# Patient Record
Sex: FEMALE | Race: WHITE | NOT HISPANIC OR LATINO | Employment: FULL TIME | URBAN - METROPOLITAN AREA
[De-identification: names, ages, dates, MRNs, and addresses within clinical notes are randomized per-mention and may not be internally consistent; named-entity substitution may affect disease eponyms.]

---

## 2018-01-11 NOTE — RESULT NOTES
Verified Results  (1) CBC/PLT/DIFF [id number] 08:43AM Claudette Pigg Order Number: [id number]     Order Number: [id number]     Test Name Result Flag Reference   WBC COUNT 5 04 Thousand/uL  4 31-10 16   RBC COUNT 4 83 Million/uL  3 81-5 12   HEMOGLOBIN 13 9 g/dL  11 5-15 4   HEMATOCRIT 41 0 %  34 8-46  1   MCV 84 9 fL  82 0-98 0   MCH 28 8 pg  26 8-34 3   MCHC 33 9 g/dL  31 4-37 4   RDW 13 8 %  11 6-15 1   MPV 11 0 fL  8 9-12 7   PLATELET COUNT 321 Thousands/uL  149-390   NEUTROPHILS RELATIVE PERCENT 50 %  43-75   LYMPHOCYTES RELATIVE PERCENT 39 %  14-44   MONOCYTES RELATIVE PERCENT 8 %  4-12   EOSINOPHILS RELATIVE PERCENT 2 %  0-6   BASOPHILS RELATIVE PERCENT 1 %  0-1   NEUTROPHILS ABSOLUTE COUNT 2 53 Thousands/µL  1 85-7 62   LYMPHOCYTES ABSOLUTE COUNT 1 97 Thousands/µL  0 60-4 47   MONOCYTES ABSOLUTE COUNT 0 39 Thousand/µL  0 17-1 22   EOSINOPHILS ABSOLUTE COUNT 0 12 Thousand/µL  0 00-0 61   BASOPHILS ABSOLUTE COUNT 0 03 Thousands/µL  0 00-0 10

## 2018-01-13 NOTE — RESULT NOTES
Verified Results  * XR TIBIA FIBULA 2 VIEW LEFT [id number] 08:47AM [employer] Order Number: [id number]     Test Name Result Flag Reference   XR TIBIA FIBULA 2 VW LEFT (Report)     LEFT TIBIA AND FIBULA     INDICATION: Left tibia and fibula pain  COMPARISON: None     VIEWS: AP and lateral; 2 images     FINDINGS:     There is no acute fracture or dislocation  No degenerative changes  No lytic or blastic lesions are seen  Soft tissues are unremarkable  IMPRESSION:     No acute osseous abnormality  Signed by:   Gt Smith DO   2/2/16     * XR TIBIA FIBULA 2 VIEW RIGHT [id number] 08:47AM [employer] Order Number: [id number]     Test Name Result Flag Reference   XR TIBIA FIBULA 2 VW RIGHT (Report)     RIGHT TIBIA AND FIBULA     INDICATION: Right tibia and fibula pain  COMPARISON: None     VIEWS: AP and lateral; 2 images     FINDINGS:     There is no acute fracture or dislocation  No degenerative changes  No lytic or blastic lesions are seen  Soft tissues are unremarkable  IMPRESSION:     No acute osseous abnormality       Signed by:   Gt Smith DO   2/2/16

## 2018-01-18 NOTE — RESULT NOTES
Verified Results  CT HEAD W WO CONTRAST [id number] 09:27AM [place] [facility]     Test Name Result Flag Reference   CT HEAD W WO CONTRAST (Report)     CT BRAIN - WITH AND WITHOUT CONTRAST     INDICATION: Severe headache     COMPARISON: None  TECHNIQUE: CT examination of the brain was performed both prior to and after the administration of intravenous contrast  In addition to axial images, coronal reformatted images were created and submitted for interpretation  Examination was performed    utilizing techniques to minimize radiation dose, including the use of dose reduction software  100 mL of Omnipaque 350 was injected intravenously, without immediate consequence  IMAGE QUALITY: Diagnostic  FINDINGS:      PARENCHYMA: No mass, mass effect or midline shift  No parenchymal abnormality  No hemorrhage  No signs of acute infarction  No abnormal enhancement   Unremarkable vasculature  VENTRICLES AND EXTRA-AXIAL SPACES: Normal for patient's age  VISUALIZED ORBITS AND PARANASAL SINUSES: Unremarkable  CALVARIUM: Normal        IMPRESSION:     Normal unenhanced and enhanced CT of the brain  Signed by:    Corrina Casey MD   2/5/16

## 2018-12-26 ENCOUNTER — OFFICE VISIT (OUTPATIENT)
Dept: URGENT CARE | Facility: CLINIC | Age: 28
End: 2018-12-26
Payer: COMMERCIAL

## 2018-12-26 VITALS
OXYGEN SATURATION: 98 % | RESPIRATION RATE: 20 BRPM | HEART RATE: 95 BPM | HEIGHT: 66 IN | SYSTOLIC BLOOD PRESSURE: 118 MMHG | TEMPERATURE: 98.7 F | WEIGHT: 132 LBS | DIASTOLIC BLOOD PRESSURE: 64 MMHG | BODY MASS INDEX: 21.21 KG/M2

## 2018-12-26 DIAGNOSIS — J04.0 ACUTE LARYNGITIS: Primary | ICD-10-CM

## 2018-12-26 PROCEDURE — 99203 OFFICE O/P NEW LOW 30 MIN: CPT | Performed by: FAMILY MEDICINE

## 2018-12-26 RX ORDER — PREDNISOLONE SODIUM PHOSPHATE 15 MG/5ML
15 SOLUTION ORAL DAILY
Qty: 75 ML | Refills: 0 | Status: SHIPPED | OUTPATIENT
Start: 2018-12-26 | End: 2018-12-31

## 2018-12-26 NOTE — PATIENT INSTRUCTIONS
Follow up with PCP in 3-5 days  Proceed to  ER if symptoms worsen  Laryngitis   WHAT YOU NEED TO KNOW:   Laryngitis is when your larynx is swollen because of an infection or irritation  The larynx is also called the voice box because it contains your vocal cords  Your vocal cords also swell and change shape, which can cause your voice to sound different  DISCHARGE INSTRUCTIONS:   Take your medicine as directed  Contact your healthcare provider if you think your medicine is not helping or if you have side effects  Tell him of her if you are allergic to any medicine  Keep a list of the medicines, vitamins, and herbs you take  Include the amounts, and when and why you take them  Bring the list or the pill bottles to follow-up visits  Carry your medicine list with you in case of an emergency  Prevent laryngitis:   · Rest your voice:  Do not shout or scream if you get laryngitis often  This will help prevent swelling and irritation of your larynx  · Avoid irritants and harmful substances:  Do not breathe in chemicals or allergens, such as pollen  Alcohol and tobacco can also irritate your larynx  · Avoid foods and liquids that can cause acid reflux: These may include carbonated drinks, spicy foods and sauces, citrus fruit, peppermint, and chocolate  · Avoid the spread of germs:        [facility] your hands often with soap and water  Carry germ-killing gel with you  You can use the gel to clean your hands when there is no soap and water available  ¨ Do not touch your eyes, nose, or mouth unless you have washed your hands first     ¨ Always cover your mouth when you cough  Cough into a tissue or your shirtsleeve so you do not spread germs from your hands  ¨ Try to avoid people who have a cold or the flu  If you are sick, stay away from others as much as possible  Follow up with your healthcare provider as directed:  Write down your questions so you remember to ask them during your visits     Contact your healthcare provider if:   · You have a fever  · You feel large, tender lumps in your neck  · You are hoarse for more than 7 days  · You have new or increased throat pain  · You have questions about your condition or care  Return to the emergency department if:   · Your throat is bleeding  · You are hoarse for more than 7 days and your chest feels tight  · You are drooling and have trouble swallowing  · You have trouble breathing  © 2017 [facility] Information is for End User's use only and may not be sold, redistributed or otherwise used for commercial purposes  All illustrations and images included in CareNotes® are the copyrighted property of A D A M , Inc  or Chepe Mandel  The above information is an  only  It is not intended as medical advice for individual conditions or treatments  Talk to your doctor, nurse or pharmacist before following any medical regimen to see if it is safe and effective for you

## 2020-08-31 ENCOUNTER — OFFICE VISIT (OUTPATIENT)
Dept: FAMILY MEDICINE CLINIC | Facility: CLINIC | Age: 30
End: 2020-08-31
Payer: COMMERCIAL

## 2020-08-31 VITALS
TEMPERATURE: 97.8 F | HEIGHT: 67 IN | BODY MASS INDEX: 24.42 KG/M2 | DIASTOLIC BLOOD PRESSURE: 78 MMHG | WEIGHT: 155.6 LBS | SYSTOLIC BLOOD PRESSURE: 110 MMHG

## 2020-08-31 DIAGNOSIS — H69.81 DYSFUNCTION OF RIGHT EUSTACHIAN TUBE: Primary | ICD-10-CM

## 2020-08-31 PROCEDURE — 1036F TOBACCO NON-USER: CPT | Performed by: FAMILY MEDICINE

## 2020-08-31 PROCEDURE — 3725F SCREEN DEPRESSION PERFORMED: CPT | Performed by: FAMILY MEDICINE

## 2020-08-31 PROCEDURE — 99202 OFFICE O/P NEW SF 15 MIN: CPT | Performed by: FAMILY MEDICINE

## 2020-08-31 PROCEDURE — 3008F BODY MASS INDEX DOCD: CPT | Performed by: FAMILY MEDICINE

## 2020-08-31 RX ORDER — MOMETASONE FUROATE 50 UG/1
2 SPRAY, METERED NASAL DAILY
Qty: 17 G | Refills: 1 | Status: SHIPPED | OUTPATIENT
Start: 2020-08-31

## 2020-08-31 NOTE — PROGRESS NOTES
Assessment/Plan:  I believe patient has dysfunction of her right eustachian tube  Rx put in for Nasonex spray, and she will pickup Claritin over-the-counter  Push fluids  Call us if symptoms continue or increase  Patient does not wish to have flu shot  Problem List Items Addressed This Visit     None      Visit Diagnoses     Dysfunction of right eustachian tube    -  Primary    Relevant Medications    mometasone (NASONEX) 50 mcg/act nasal spray           Diagnoses and all orders for this visit:    Dysfunction of right eustachian tube  -     mometasone (NASONEX) 50 mcg/act nasal spray; 2 sprays into each nostril daily        No problem-specific Assessment & Plan notes found for this encounter  Subjective:      Patient ID: Perla England is a 31 y o  female  Patient here today stating for the past couple weeks has had some heaviness" in the right ear  Sometimes a sharp pain  Patient has mild decreased hearing  No discharge  No fever chills  Left ear is not as bad  She can hear well out of the left ear  The following portions of the patient's history were reviewed and updated as appropriate:   She has no past medical history on file  ,  does not have a problem list on file  ,   has no past surgical history on file  ,  family history is not on file  ,   reports that she has never smoked  She has never used smokeless tobacco  She reports previous alcohol use  No history on file for drug ,  has No Known Allergies     Current Outpatient Medications   Medication Sig Dispense Refill    mometasone (NASONEX) 50 mcg/act nasal spray 2 sprays into each nostril daily 17 g 1     No current facility-administered medications for this visit  Review of Systems   Constitutional: Negative  HENT: Positive for ear pain and hearing loss (Mild, in the right ear)  Negative for congestion and rhinorrhea  Respiratory: Negative  Cardiovascular: Negative  Gastrointestinal: Negative      Genitourinary: Negative  Objective:  Vitals:    08/31/20 0945   BP: 110/78   Temp: 97 8 °F (36 6 °C)   Weight: 70 6 kg (155 lb 9 6 oz)   Height: 5' 6 5" (1 689 m)     Body mass index is 24 74 kg/m²  Physical Exam  Vitals signs reviewed  Constitutional:       General: She is not in acute distress  Appearance: She is well-developed  She is not diaphoretic  HENT:      Head: Normocephalic and atraumatic  Right Ear: Tympanic membrane is retracted (Mild)  Tympanic membrane is not erythematous  Ears:      Comments: Cerumen noted in the left canal  Eyes:      Conjunctiva/sclera: Conjunctivae normal    Cardiovascular:      Rate and Rhythm: Normal rate and regular rhythm  Heart sounds: Normal heart sounds  No murmur  No friction rub  No gallop  Pulmonary:      Effort: Pulmonary effort is normal  No respiratory distress  Breath sounds: Normal breath sounds  No wheezing or rales  Neurological:      Mental Status: She is alert and oriented to person, place, and time  Psychiatric:         Behavior: Behavior normal          Thought Content:  Thought content normal          Judgment: Judgment normal

## 2021-09-16 ENCOUNTER — OFFICE VISIT (OUTPATIENT)
Dept: URGENT CARE | Facility: CLINIC | Age: 31
End: 2021-09-16
Payer: COMMERCIAL

## 2021-09-16 VITALS
HEART RATE: 132 BPM | WEIGHT: 129 LBS | SYSTOLIC BLOOD PRESSURE: 105 MMHG | DIASTOLIC BLOOD PRESSURE: 67 MMHG | RESPIRATION RATE: 20 BRPM | BODY MASS INDEX: 20.51 KG/M2 | TEMPERATURE: 98.8 F | OXYGEN SATURATION: 98 %

## 2021-09-16 DIAGNOSIS — R51.9 NONINTRACTABLE HEADACHE, UNSPECIFIED CHRONICITY PATTERN, UNSPECIFIED HEADACHE TYPE: ICD-10-CM

## 2021-09-16 DIAGNOSIS — R55 SYNCOPE, UNSPECIFIED SYNCOPE TYPE: Primary | ICD-10-CM

## 2021-09-16 DIAGNOSIS — S01.511A LIP LACERATION, INITIAL ENCOUNTER: ICD-10-CM

## 2021-09-16 PROCEDURE — S9088 SERVICES PROVIDED IN URGENT: HCPCS | Performed by: PHYSICIAN ASSISTANT

## 2021-09-16 PROCEDURE — 99213 OFFICE O/P EST LOW 20 MIN: CPT | Performed by: PHYSICIAN ASSISTANT

## 2021-09-16 NOTE — PATIENT INSTRUCTIONS
If symptoms reoccur or headache worsens go to the emergency room for further evaluation  Syncope   WHAT YOU NEED TO KNOW:   Syncope is also called fainting or passing out  Syncope is a sudden, temporary loss of consciousness, followed by a fall from a standing or sitting position  Syncope ranges from not serious to a sign of a more serious condition that needs to be treated  You can control some health conditions that cause syncope  Your healthcare providers can help you create a plan to manage syncope and prevent episodes  DISCHARGE INSTRUCTIONS:   Seek care immediately if:   · You are bleeding because you hit your head when you fainted  · You suddenly have double vision, difficulty speaking, numbness, and cannot move your arms or legs  · You have chest pain and trouble breathing  · You vomit blood or material that looks like coffee grounds  · You see blood in your bowel movement  Contact your healthcare provider if:   · You have new or worsening symptoms  · You have another syncope episode  · You have a headache, fast heartbeat, or feel too dizzy to stand up  · You have questions or concerns about your condition or care  Medicines:   · Medicines  may be needed to help your heart pump strongly and regularly  Your healthcare provider may also make changes to any medicines that are causing syncope  · Take your medicine as directed  Contact your healthcare provider if you think your medicine is not helping or if you have side effects  Tell him or her if you are allergic to any medicine  Keep a list of the medicines, vitamins, and herbs you take  Include the amounts, and when and why you take them  Bring the list or the pill bottles to follow-up visits  Carry your medicine list with you in case of an emergency  Follow up with your healthcare provider as directed:  Write down your questions so you remember to ask them during your visits     Manage syncope:   · Keep a record of your syncope episodes  Include your symptoms and your activity before and after the episode  The record can help your healthcare provider find the cause of your syncope and help you manage episodes  · Sit or lie down when needed  This includes when you feel dizzy, your throat is getting tight, and your vision changes  Raise your legs above the level of your heart  · Take slow, deep breaths if you start to breathe faster with anxiety or fear  This can help decrease dizziness and the feeling that you might faint  · Check your blood pressure often  This is important if you take medicine to lower your blood pressure  Check your blood pressure when you are lying down and when you are standing  Ask how often to check during the day  Keep a record of your blood pressure numbers  Your healthcare provider may use the record to help plan your treatment  Prevent a syncope episode:   · Move slowly and let yourself get used to one position before you move to another position  This is very important when you change from a lying or sitting position to a standing position  Take some deep breaths before you stand up from a lying position  Stand up slowly  Sudden movements may cause a fainting spell  Sit on the side of the bed or couch for a few minutes before you stand up  If you are on bedrest, try to be upright for about 2 hours each day, or as directed  Do not lock your legs if you are standing for a long period of time  Move your legs and bend your knees to keep blood flowing  · Follow your healthcare provider's recommendations  Your provider may  recommend that you drink more liquids to prevent dehydration  You may also need to have more salt to keep your blood pressure from dropping too low and causing syncope  Your provider will tell you how much liquid and sodium to have each day  He or she will also tell you how much physical activity is safe for you   This will depend on what is causing your syncope  · Watch for signs of low blood sugar  These include hunger, nervousness, sweating, and fast or fluttery heartbeats  Talk with your healthcare provider about ways to keep your blood sugar level steady  · Do not strain if you are constipated  You may faint if you strain to have a bowel movement  Walking is the best way to get your bowels moving  Eat foods high in fiber to make it easier to have a bowel movement  Good examples are high-fiber cereals, beans, vegetables, and whole-grain breads  Prune juice may help make bowel movements softer  · Be careful in hot weather  Heat can cause a syncope episode  Limit activity done outside on hot days  Physical activity in hot weather can lead to dehydration  This can cause an episode  © Copyright [employer] 2021 Information is for End User's use only and may not be sold, redistributed or otherwise used for commercial purposes  All illustrations and images included in CareNotes® are the copyrighted property of A D A M , Inc  or [facility] Jakob Gipson   The above information is an  only  It is not intended as medical advice for individual conditions or treatments  Talk to your doctor, nurse or pharmacist before following any medical regimen to see if it is safe and effective for you

## 2021-09-16 NOTE — PROGRESS NOTES
[facility]        NAME: Ana Dorado is a 36 y o  female  : 1990    MRN: [id number]  DATE: 2021  TIME: 10:21 AM    Assessment and Plan   Syncope, unspecified syncope type [R55]  1  Syncope, unspecified syncope type     2  Lip laceration, initial encounter     3  Nonintractable headache, unspecified chronicity pattern, unspecified headache type           Patient Instructions       Follow up with PCP in 3-5 days  Proceed to  ER if symptoms worsen  Chief Complaint     Chief Complaint   Patient presents with    Loss of Consciousness     passed out , woke up on bathroom floor this am , cut inside of lip, right rib area pain         History of Present Illness       Patient states at approximately 7:00 a m  this morning she was sitting on the toilet urinating and she felt very hot and passed out her 3year-old son awoke or up stating she fell on the floor  She believes she may have been laying there for approximately 5 minutes  She now has a typical headache over her left occipital area  Patient suffers from migraines  No changes in vision nausea vomiting dizziness or difficulty concentrating  She did sustain a laceration to the inner aspect of the right lower lip  She is not on any blood thinners and does not take aspirin  Patient states she feels back to normal and her headache is her typical headache  She plans on taking her medication to treat her headaches when she arrives home  Review of Systems   Review of Systems   Constitutional: Negative for chills and fever  Respiratory: Negative for cough and shortness of breath  Gastrointestinal: Negative for abdominal pain, nausea and vomiting  Musculoskeletal: Negative for arthralgias and myalgias  Skin: Negative for rash  Neurological: Positive for facial asymmetry  Negative for dizziness, weakness, light-headedness and numbness           Current Medications       Current Outpatient Medications:    mometasone (NASONEX) 50 mcg/act nasal spray, 2 sprays into each nostril daily (Patient not taking: Reported on 9/16/2021), Disp: 17 g, Rfl: 1    Current Allergies     Allergies as of 09/16/2021    (No Known Allergies)            The following portions of the patient's history were reviewed and updated as appropriate: allergies, current medications, past family history, past medical history, past social history, past surgical history and problem list      History reviewed  No pertinent past medical history  Past Surgical History:   Procedure Laterality Date    WISDOM TOOTH EXTRACTION         History reviewed  No pertinent family history  Medications have been verified  Objective   /67   Pulse (!) 132   Temp 98 8 °F (37 1 °C)   Resp 20   Wt 58 5 kg (129 lb)   LMP 09/14/2021 (Exact Date)   SpO2 98%   Breastfeeding No   BMI 20 51 kg/m²   Patient's last menstrual period was 09/14/2021 (exact date)  Physical Exam     Physical Exam  Vitals and nursing note reviewed  Constitutional:       Appearance: Normal appearance  She is normal weight  HENT:      Head: Normocephalic and atraumatic  Right Ear: Ear canal and external ear normal       Left Ear: Ear canal and external ear normal       Nose: Nose normal       Mouth/Throat:      Mouth: Mucous membranes are moist       Pharynx: Oropharynx is clear  Comments: Superficial laceration plantar aspect of the right lower lip which is healing and not bleeding  Eyes:      Extraocular Movements: Extraocular movements intact  Conjunctiva/sclera: Conjunctivae normal       Pupils: Pupils are equal, round, and reactive to light  Comments: No nystagmus  Cardiovascular:      Rate and Rhythm: Normal rate and regular rhythm  Heart sounds: Normal heart sounds  Pulmonary:      Effort: Pulmonary effort is normal       Breath sounds: Normal breath sounds  Musculoskeletal:         General: Normal range of motion        Cervical back: Normal range of motion and neck supple  No tenderness  Skin:     General: Skin is warm  Neurological:      General: No focal deficit present  Mental Status: She is alert and oriented to person, place, and time  Cranial Nerves: No cranial nerve deficit  Motor: No weakness  Coordination: Coordination normal       Gait: Gait normal       Deep Tendon Reflexes: Reflexes normal    Psychiatric:         Mood and Affect: Mood normal          Behavior: Behavior normal          Thought Content:  Thought content normal          Judgment: Judgment normal

## 2022-10-24 ENCOUNTER — OFFICE VISIT (OUTPATIENT)
Dept: URGENT CARE | Facility: CLINIC | Age: 32
End: 2022-10-24
Payer: COMMERCIAL

## 2022-10-24 VITALS
BODY MASS INDEX: 20.51 KG/M2 | OXYGEN SATURATION: 99 % | TEMPERATURE: 99.1 F | RESPIRATION RATE: 20 BRPM | HEART RATE: 100 BPM | WEIGHT: 129 LBS

## 2022-10-24 DIAGNOSIS — H72.91 OTITIS MEDIA, SEROUS, TM RUPTURE, RIGHT: Primary | ICD-10-CM

## 2022-10-24 DIAGNOSIS — H65.91 OTITIS MEDIA, SEROUS, TM RUPTURE, RIGHT: Primary | ICD-10-CM

## 2022-10-24 DIAGNOSIS — H92.01 ACUTE EAR PAIN, RIGHT: ICD-10-CM

## 2022-10-24 PROCEDURE — 99213 OFFICE O/P EST LOW 20 MIN: CPT | Performed by: NURSE PRACTITIONER

## 2022-10-24 RX ORDER — OFLOXACIN 3 MG/ML
10 SOLUTION AURICULAR (OTIC) 2 TIMES DAILY
Qty: 10 ML | Refills: 0 | Status: SHIPPED | OUTPATIENT
Start: 2022-10-24 | End: 2022-11-07

## 2022-10-24 NOTE — PROGRESS NOTES
[facility]        NAME: Giovanna Prater is a 29 y o  female  : 1990    MRN: [id number]  DATE: 2022  TIME: 8:56 AM    Assessment and Plan   Otitis media, serous, tm rupture, right [H65 91, H72 91]  1  Otitis media, serous, tm rupture, right  ofloxacin (FLOXIN) 0 3 % otic solution   2  Acute ear pain, right  ofloxacin (FLOXIN) 0 3 % otic solution         Patient Instructions       Follow up with PCP in 3-5 days  Proceed to  ER if symptoms worsen  You are to use the ear drops as prescribed  You appear to have a ruptured ear drum  You are to take tylenol or motrin for pain  Apply cotton balls to absorb drainage  Follow up with your PCP in 2-3 days  See ENT if symptoms do not improve   Go to the eD if symptoms worsen         Chief Complaint     Chief Complaint   Patient presents with   • Earache     Bleeding from ear , since early this am         History of Present Illness       This is a 29year old female who states has linda congested and this am she woke up to severe right ear pain and then noted blood and drainage from the ear  Doesn't really admit to instant relief after noting bloody drainage  She states she has been taking tylenol and  Motrin for pain  Denies fevers  Denies pregnancy  She states she has been taking OTC meds for cold symptoms  Review of Systems   Review of Systems   Constitutional: Negative  HENT: Positive for congestion, ear discharge and ear pain  Eyes: Negative  Respiratory: Negative  Cardiovascular: Negative  Gastrointestinal: Negative  Endocrine: Negative  Genitourinary: Negative  Musculoskeletal: Negative  Skin: Negative  Allergic/Immunologic: Negative  Neurological: Negative  Hematological: Negative  Psychiatric/Behavioral: Negative            Current Medications       Current Outpatient Medications:   •  ofloxacin (FLOXIN) 0 3 % otic solution, Administer 10 drops to the right ear 2 (two) times a day for 14 days, Disp: 10 mL, Rfl: 0  •  mometasone (NASONEX) 50 mcg/act nasal spray, 2 sprays into each nostril daily (Patient not taking: No sig reported), Disp: 17 g, Rfl: 1    Current Allergies     Allergies as of 10/24/2022   • (No Known Allergies)            The following portions of the patient's history were reviewed and updated as appropriate: allergies, current medications, past family history, past medical history, past social history, past surgical history and problem list      History reviewed  No pertinent past medical history  Past Surgical History:   Procedure Laterality Date   • WISDOM TOOTH EXTRACTION         History reviewed  No pertinent family history  Medications have been verified  Objective   Pulse 100   Temp 99 1 °F (37 3 °C)   Resp 20   Wt 58 5 kg (129 lb)   LMP 10/09/2022 (Exact Date)   SpO2 99%   BMI 20 51 kg/m²   Patient's last menstrual period was 10/09/2022 (exact date)  Physical Exam     Physical Exam  Vitals and nursing note reviewed  Constitutional:       General: She is not in acute distress  Appearance: Normal appearance  She is normal weight  She is not ill-appearing, toxic-appearing or diaphoretic  HENT:      Head: Normocephalic and atraumatic  Left Ear: Tympanic membrane and ear canal normal       Ears:      Comments: Right ear canal with blood in it  TM is noted to be shiny, no blood at TM however there does appear to be a small hole  Pain with otoscope insertion  Nose: Congestion present  Mouth/Throat:      Mouth: Mucous membranes are moist    Eyes:      Extraocular Movements: Extraocular movements intact  Cardiovascular:      Rate and Rhythm: Normal rate and regular rhythm  Pulses: Normal pulses  Heart sounds: Normal heart sounds  Pulmonary:      Effort: Pulmonary effort is normal       Breath sounds: Normal breath sounds  Abdominal:      General: There is no distension  Palpations: Abdomen is soft  Tenderness: There is no abdominal tenderness  Musculoskeletal:         General: Normal range of motion  Cervical back: Normal range of motion and neck supple  Skin:     General: Skin is warm and dry  Capillary Refill: Capillary refill takes less than 2 seconds  Neurological:      General: No focal deficit present  Mental Status: She is alert and oriented to person, place, and time  Psychiatric:         Mood and Affect: Mood normal          Behavior: Behavior normal          Thought Content:  Thought content normal          Judgment: Judgment normal

## 2022-10-24 NOTE — PATIENT INSTRUCTIONS
You are to use the ear drops as prescribed  You appear to have a ruptured ear drum  You are to take tylenol or motrin for pain  Apply cotton balls to absorb drainage  Follow up with your PCP in 2-3 days    See ENT if symptoms do not improve   Go to the eD if symptoms worsen

## 2022-10-24 NOTE — LETTER
October 24, 2022     Patient: Helio Schroeder   YOB: 1990   Date of Visit: 10/24/2022       To Whom It May Concern: It is my medical opinion that Carolina Kelly may return to work on 10/25/2022  If you have any questions or concerns, please don't hesitate to call           Sincerely,        Unique JOSE    CC: No Recipients

## 2022-10-26 ENCOUNTER — OFFICE VISIT (OUTPATIENT)
Dept: FAMILY MEDICINE CLINIC | Facility: CLINIC | Age: 32
End: 2022-10-26
Payer: COMMERCIAL

## 2022-10-26 VITALS
HEART RATE: 97 BPM | SYSTOLIC BLOOD PRESSURE: 108 MMHG | WEIGHT: 140 LBS | DIASTOLIC BLOOD PRESSURE: 76 MMHG | HEIGHT: 66 IN | OXYGEN SATURATION: 98 % | TEMPERATURE: 97.2 F | BODY MASS INDEX: 22.5 KG/M2

## 2022-10-26 DIAGNOSIS — H92.21 EAR DISCHARGE BLOOD, RIGHT: Primary | ICD-10-CM

## 2022-10-26 DIAGNOSIS — H69.81 DYSFUNCTION OF RIGHT EUSTACHIAN TUBE: ICD-10-CM

## 2022-10-26 PROCEDURE — 99213 OFFICE O/P EST LOW 20 MIN: CPT | Performed by: FAMILY MEDICINE

## 2022-10-26 RX ORDER — MOMETASONE FUROATE 50 UG/1
2 SPRAY, METERED NASAL DAILY
Qty: 17 G | Refills: 3 | Status: SHIPPED | OUTPATIENT
Start: 2022-10-26

## 2022-10-26 NOTE — PROGRESS NOTES
Name: Kathleen Rosario      : 1990      MRN: [id number]  Encounter Provider: Suzy Devlin DO  Encounter Date: 10/26/2022   Encounter department: [facility] [address]   Patient will continue the Floxin otic drops  Referral made to ENT  Also refilled her Nasonex nose spray  Follow-up here as scheduled or p r n   1  Ear discharge blood, right  -     Ambulatory Referral to Otolaryngology; Future    2  Dysfunction of right eustachian tube  -     mometasone (NASONEX) 50 mcg/act nasal spray; 2 sprays into each nostril daily  -     Ambulatory Referral to Otolaryngology; Future         Subjective     Patient here today for follow-up from urgent care  Patient states for the past couple weeks has had sinus congestion and ear congestion  On Monday woke up with severe right ear pain  Later on in the day, noticed bloody discharge from the right ear  Went to urgent care, diagnosed with a perforation of the right ear drum  They gave for Floxin otic drops which she has been using since yesterday  Patient still has had some blood discharge from her ear, along with decreased hearing  Pain is not as bad as it has been in the right ear  Review of Systems   Constitutional: Negative  HENT: Positive for ear discharge ( right ear), ear pain ( right ear) and hearing loss (Right ear)  Respiratory: Negative  Cardiovascular: Negative  Gastrointestinal: Negative  Genitourinary: Negative  History reviewed  No pertinent past medical history    Past Surgical History:   Procedure Laterality Date   • WISDOM TOOTH EXTRACTION       Family History   Problem Relation Age of Onset   • No Known Problems Mother      Social History     Socioeconomic History   • Marital status: Single     Spouse name: None   • Number of children: None   • Years of education: None   • Highest education level: None   Occupational History   • None   Tobacco Use   • Smoking status: Never Smoker   • Smokeless tobacco: Never Used   Vaping Use   • Vaping Use: Never used   Substance and Sexual Activity   • Alcohol use: Not Currently   • Drug use: Never   • Sexual activity: None   Other Topics Concern   • None   Social History Narrative   • None     Social Determinants of Health     Financial Resource Strain: Not on file   Food Insecurity: Not on file   Transportation Needs: Not on file   Physical Activity: Not on file   Stress: Not on file   Social Connections: Not on file   Intimate Partner Violence: Not on file   Housing Stability: Not on file     Current Outpatient Medications on File Prior to Visit   Medication Sig   • ofloxacin (FLOXIN) 0 3 % otic solution Administer 10 drops to the right ear 2 (two) times a day for 14 days   • [DISCONTINUED] mometasone (NASONEX) 50 mcg/act nasal spray 2 sprays into each nostril daily     No Known Allergies  Immunization History   Administered Date(s) Administered   • Tdap 02/14/2017       Objective     /76   Pulse 97   Temp (!) 97 2 °F (36 2 °C)   Ht 5' 6" (1 676 m)   Wt 63 5 kg (140 lb)   LMP 10/09/2022 (Exact Date)   SpO2 98%   BMI 22 60 kg/m²     Physical Exam  Vitals reviewed  Constitutional:       General: She is not in acute distress  Appearance: Normal appearance  She is well-developed  She is not ill-appearing, toxic-appearing or diaphoretic  HENT:      Head: Normocephalic and atraumatic  Right Ear: Tympanic membrane is erythematous and bulging  Left Ear: Tympanic membrane normal       Ears:      Comments: Right ear drum bulging  I was not able to appreciate a perforation  Eyes:      Conjunctiva/sclera: Conjunctivae normal    Cardiovascular:      Rate and Rhythm: Normal rate and regular rhythm  Heart sounds: Normal heart sounds  No murmur heard  No friction rub  No gallop  Pulmonary:      Effort: Pulmonary effort is normal  No respiratory distress  Breath sounds: Normal breath sounds  No wheezing, rhonchi or rales  Musculoskeletal:      Right lower leg: No edema  Left lower leg: No edema  Neurological:      General: No focal deficit present  Mental Status: She is alert and oriented to person, place, and time  Psychiatric:         Mood and Affect: Mood normal          Behavior: Behavior normal          Thought Content:  Thought content normal          Judgment: Judgment normal        Saulo Roth DO

## 2023-04-15 ENCOUNTER — HOSPITAL ENCOUNTER (INPATIENT)
Facility: HOSPITAL | Age: 33
LOS: 1 days | Discharge: HOME | End: 2023-04-16
Attending: OBSTETRICS & GYNECOLOGY | Admitting: OBSTETRICS & GYNECOLOGY

## 2023-04-15 DIAGNOSIS — N83.209 HEMORRHAGIC OVARIAN CYST: Primary | ICD-10-CM

## 2023-04-15 DIAGNOSIS — Z87.42 S/P OVARIAN CYSTECTOMY: ICD-10-CM

## 2023-04-15 DIAGNOSIS — Z98.890 S/P OVARIAN CYSTECTOMY: ICD-10-CM

## 2023-04-15 PROBLEM — D62 ACUTE BLOOD LOSS ANEMIA (ABLA): Status: ACTIVE | Noted: 2023-04-15

## 2023-04-15 LAB
ABO GROUP BLD BPU: NORMAL
ABO GROUP BLD: NORMAL
BLD GP AB SCN SERPL QL: NEGATIVE
BPU ID: NORMAL
CROSSMATCH: NORMAL
ERYTHROCYTE [DISTWIDTH] IN BLOOD BY AUTOMATED COUNT: 14.6 % (ref 11.6–15.1)
HCT VFR BLD AUTO: 30.7 % (ref 34.8–46.1)
HGB BLD-MCNC: 10 G/DL (ref 11.5–15.4)
MCH RBC QN AUTO: 27.4 PG (ref 26.8–34.3)
MCHC RBC AUTO-ENTMCNC: 32.6 G/DL (ref 31.4–37.4)
MCV RBC AUTO: 84 FL (ref 82–98)
PLATELET # BLD AUTO: 251 THOUSANDS/UL (ref 149–390)
PMV BLD AUTO: 9.8 FL (ref 8.9–12.7)
RBC # BLD AUTO: 3.65 MILLION/UL (ref 3.81–5.12)
RH BLD: POSITIVE
SPECIMEN EXPIRATION DATE: NORMAL
UNIT DISPENSE STATUS: NORMAL
UNIT PRODUCT CODE: NORMAL
UNIT PRODUCT VOLUME: 300 ML
UNIT PRODUCT VOLUME: 300 ML
UNIT PRODUCT VOLUME: 350 ML
UNIT RH: NORMAL
WBC # BLD AUTO: 7.31 THOUSAND/UL (ref 4.31–10.16)

## 2023-04-15 PROCEDURE — 0UB04ZZ EXCISION OF RIGHT OVARY, PERCUTANEOUS ENDOSCOPIC APPROACH: ICD-10-PCS | Performed by: OBSTETRICS & GYNECOLOGY

## 2023-04-15 RX ORDER — MAGNESIUM HYDROXIDE 1200 MG/15ML
LIQUID ORAL AS NEEDED
Status: DISCONTINUED | OUTPATIENT
Start: 2023-04-15 | End: 2023-04-16 | Stop reason: HOSPADM

## 2023-04-15 RX ORDER — SODIUM CHLORIDE, SODIUM LACTATE, POTASSIUM CHLORIDE, CALCIUM CHLORIDE 600; 310; 30; 20 MG/100ML; MG/100ML; MG/100ML; MG/100ML
125 INJECTION, SOLUTION INTRAVENOUS CONTINUOUS
Status: DISCONTINUED | OUTPATIENT
Start: 2023-04-15 | End: 2023-04-16 | Stop reason: HOSPADM

## 2023-04-15 RX ORDER — ONDANSETRON 2 MG/ML
4 INJECTION INTRAMUSCULAR; INTRAVENOUS EVERY 6 HOURS PRN
Status: DISCONTINUED | OUTPATIENT
Start: 2023-04-15 | End: 2023-04-16 | Stop reason: HOSPADM

## 2023-04-16 VITALS
SYSTOLIC BLOOD PRESSURE: 105 MMHG | HEART RATE: 112 BPM | OXYGEN SATURATION: 98 % | RESPIRATION RATE: 20 BRPM | TEMPERATURE: 97.9 F | DIASTOLIC BLOOD PRESSURE: 66 MMHG

## 2023-04-16 PROBLEM — Z87.42 S/P OVARIAN CYSTECTOMY: Status: ACTIVE | Noted: 2023-04-16

## 2023-04-16 PROBLEM — Z98.890 S/P OVARIAN CYSTECTOMY: Status: ACTIVE | Noted: 2023-04-16

## 2023-04-16 PROBLEM — D62 ACUTE BLOOD LOSS ANEMIA (ABLA): Status: RESOLVED | Noted: 2023-04-15 | Resolved: 2023-04-16

## 2023-04-16 LAB
ERYTHROCYTE [DISTWIDTH] IN BLOOD BY AUTOMATED COUNT: 14.5 % (ref 11.6–15.1)
HCT VFR BLD AUTO: 28.6 % (ref 34.8–46.1)
HGB BLD-MCNC: 9.3 G/DL (ref 11.5–15.4)
MCH RBC QN AUTO: 27.4 PG (ref 26.8–34.3)
MCHC RBC AUTO-ENTMCNC: 32.5 G/DL (ref 31.4–37.4)
MCV RBC AUTO: 84 FL (ref 82–98)
PLATELET # BLD AUTO: 221 THOUSANDS/UL (ref 149–390)
PMV BLD AUTO: 9.7 FL (ref 8.9–12.7)
RBC # BLD AUTO: 3.39 MILLION/UL (ref 3.81–5.12)
WBC # BLD AUTO: 8.33 THOUSAND/UL (ref 4.31–10.16)

## 2023-04-16 RX ORDER — FENTANYL CITRATE/PF 50 MCG/ML
25 SYRINGE (ML) INJECTION
Status: DISCONTINUED | OUTPATIENT
Start: 2023-04-16 | End: 2023-04-16 | Stop reason: HOSPADM

## 2023-04-16 RX ORDER — BUPIVACAINE HYDROCHLORIDE 2.5 MG/ML
INJECTION, SOLUTION EPIDURAL; INFILTRATION; INTRACAUDAL AS NEEDED
Status: DISCONTINUED | OUTPATIENT
Start: 2023-04-16 | End: 2023-04-16 | Stop reason: HOSPADM

## 2023-04-16 RX ORDER — OXYCODONE HCL 5 MG/5 ML
5 SOLUTION, ORAL ORAL EVERY 4 HOURS PRN
Qty: 75 ML | Refills: 0 | Status: SHIPPED | OUTPATIENT
Start: 2023-04-16 | End: 2023-04-26

## 2023-04-16 RX ORDER — HYDROMORPHONE HCL/PF 1 MG/ML
0.5 SYRINGE (ML) INJECTION
Status: DISCONTINUED | OUTPATIENT
Start: 2023-04-16 | End: 2023-04-16 | Stop reason: HOSPADM

## 2023-04-16 RX ORDER — KETOROLAC TROMETHAMINE 30 MG/ML
15 INJECTION, SOLUTION INTRAMUSCULAR; INTRAVENOUS EVERY 6 HOURS PRN
Status: DISCONTINUED | OUTPATIENT
Start: 2023-04-16 | End: 2023-04-16 | Stop reason: HOSPADM

## 2023-04-16 RX ORDER — ONDANSETRON 2 MG/ML
4 INJECTION INTRAMUSCULAR; INTRAVENOUS ONCE AS NEEDED
Status: DISCONTINUED | OUTPATIENT
Start: 2023-04-16 | End: 2023-04-16 | Stop reason: HOSPADM

## 2023-04-16 RX ORDER — OXYCODONE HCL 5 MG/5 ML
5 SOLUTION, ORAL ORAL EVERY 4 HOURS PRN
Status: DISCONTINUED | OUTPATIENT
Start: 2023-04-16 | End: 2023-04-16 | Stop reason: HOSPADM

## 2023-04-16 RX ORDER — ACETAMINOPHEN 650 MG/20.3ML
650 SUSPENSION ORAL EVERY 6 HOURS
Status: DISCONTINUED | OUTPATIENT
Start: 2023-04-16 | End: 2023-04-16 | Stop reason: HOSPADM

## 2023-04-16 RX ORDER — ACETAMINOPHEN 650 MG/20.3ML
650 SUSPENSION ORAL EVERY 6 HOURS PRN
Qty: 355 ML | Refills: 1 | Status: SHIPPED | OUTPATIENT
Start: 2023-04-16

## 2023-04-16 RX ADMIN — SODIUM CHLORIDE, SODIUM LACTATE, POTASSIUM CHLORIDE, AND CALCIUM CHLORIDE 125 ML/HR: .6; .31; .03; .02 INJECTION, SOLUTION INTRAVENOUS at 02:20

## 2023-04-16 RX ADMIN — ACETAMINOPHEN 650 MG: 650 SUSPENSION ORAL at 02:55

## 2023-04-16 RX ADMIN — ACETAMINOPHEN 650 MG: 650 SUSPENSION ORAL at 07:59

## 2023-04-16 NOTE — PLAN OF CARE
Problem: PAIN - ADULT  Goal: Verbalizes/displays adequate comfort level or baseline comfort level  Description: Interventions:  - Encourage patient to monitor pain and request assistance  - Assess pain using appropriate pain scale  - Administer analgesics based on type and severity of pain and evaluate response  - Implement non-pharmacological measures as appropriate and evaluate response  - Consider cultural and social influences on pain and pain management  - Notify physician/advanced practitioner if interventions unsuccessful or patient reports new pain  Outcome: Progressing     Problem: INFECTION - ADULT  Goal: Absence or prevention of progression during hospitalization  Description: INTERVENTIONS:  - Assess and monitor for signs and symptoms of infection  - Monitor lab/diagnostic results  - Monitor all insertion sites, i e  indwelling lines, tubes, and drains  - Monitor endotracheal if appropriate and nasal secretions for changes in amount and color  - [place] appropriate cooling/warming therapies per order  - Administer medications as ordered  - Instruct and encourage patient and family to use good hand hygiene technique  - Identify and instruct in appropriate isolation precautions for identified infection/condition  Outcome: Progressing     Problem: DISCHARGE PLANNING  Goal: Discharge to home or other facility with appropriate resources  Description: INTERVENTIONS:  - Identify barriers to discharge w/patient and caregiver  - Arrange for needed discharge resources and transportation as appropriate  - Identify discharge learning needs (meds, wound care, etc )  - Arrange for interpretive services to assist at discharge as needed  - Refer to Case Management Department for coordinating discharge planning if the patient needs post-hospital services based on physician/advanced practitioner order or complex needs related to functional status, cognitive ability, or social support system  Outcome: Progressing Problem: Knowledge Deficit  Goal: Patient/family/caregiver demonstrates understanding of disease process, treatment plan, medications, and discharge instructions  Description: Complete learning assessment and assess knowledge base    Interventions:  - Provide teaching at level of understanding  - Provide teaching via preferred learning methods  Outcome: Progressing     Problem: CARDIOVASCULAR - ADULT  Goal: Maintains optimal cardiac output and hemodynamic stability  Description: INTERVENTIONS:  - Monitor I/O, vital signs and rhythm  - Monitor for S/S and trends of decreased cardiac output  - Administer and titrate ordered vasoactive medications to optimize hemodynamic stability  - Assess quality of pulses, skin color and temperature  - Assess for signs of decreased coronary artery perfusion  - Instruct patient to report change in severity of symptoms  Outcome: Progressing     Problem: GASTROINTESTINAL - ADULT  Goal: Minimal or absence of nausea and/or vomiting  Description: INTERVENTIONS:  - Administer IV fluids if ordered to ensure adequate hydration  - Maintain NPO status until nausea and vomiting are resolved  - Nasogastric tube if ordered  - Administer ordered antiemetic medications as needed  - Provide nonpharmacologic comfort measures as appropriate  - Advance diet as tolerated, if ordered  - Consider nutrition services referral to assist patient with adequate nutrition and appropriate food choices  Outcome: Progressing     Problem: METABOLIC, FLUID AND ELECTROLYTES - ADULT  Goal: Electrolytes maintained within normal limits  Description: INTERVENTIONS:  - Monitor labs and assess patient for signs and symptoms of electrolyte imbalances  - Administer electrolyte replacement as ordered  - Monitor response to electrolyte replacements, including repeat lab results as appropriate  - Instruct patient on fluid and nutrition as appropriate  Outcome: Progressing  Goal: Fluid balance maintained  Description: INTERVENTIONS:  - Monitor labs   - Monitor I/O and WT  - Instruct patient on fluid and nutrition as appropriate  - Assess for signs & symptoms of volume excess or deficit  Outcome: Progressing     Problem: SKIN/TISSUE INTEGRITY - ADULT  Goal: Skin Integrity remains intact(Skin Breakdown Prevention)  Description: Assess:  -Perform Jose Luis assessment every shift and prn  -Inspect skin when repositioning, toileting, and assisting with ADLS  -Assess extremities for adequate circulation and sensation     Bed Management:  -Have minimal linens on bed & keep smooth, unwrinkled  -Change linens as needed when moist or perspiring    Toileting:  -Offer bedside commode    Activity:  -Encourage activity and walks on unit  -Encourage or provide ROM exercises   -Use appropriate equipment to lift or move patient in bed    Skin Care:  -Avoid use of baby powder, tape, friction and shearing, hot water or constrictive clothing  -Do not massage red bony areas    Outcome: Progressing     Problem: HEMATOLOGIC - ADULT  Goal: Maintains hematologic stability  Description: INTERVENTIONS  - Assess for signs and symptoms of bleeding or hemorrhage  - Monitor labs  - Administer supportive blood products/factors as ordered and appropriate  Outcome: Progressing     Problem: Nutrition/Hydration-ADULT  Goal: Nutrient/Hydration intake appropriate for improving, restoring or maintaining nutritional needs  Description: Monitor and assess patient's nutrition/hydration status for malnutrition  Collaborate with interdisciplinary team and initiate plan and interventions as ordered  Monitor patient's weight and dietary intake as ordered or per policy  Utilize nutrition screening tool and intervene as necessary  Determine patient's food preferences and provide high-protein, high-caloric foods as appropriate       INTERVENTIONS:  - Monitor oral intake, urinary output, labs, and treatment plans  - Assess nutrition and hydration status and recommend course of action  - Evaluate amount of meals eaten  - Assist patient with eating if necessary   - Allow adequate time for meals  - Recommend/ encourage appropriate diets, oral nutritional supplements, and vitamin/mineral supplements  - Order, calculate, and assess calorie counts as needed  - Recommend, monitor, and adjust tube feedings and TPN/PPN based on assessed needs  - Assess need for intravenous fluids  - Provide specific nutrition/hydration education as appropriate  - Include patient/family/caregiver in decisions related to nutrition  Outcome: Progressing

## 2023-04-16 NOTE — PROGRESS NOTES
Progress Note - OB/GYN  Mike Araiza 29 y o  female MRN: [id number]  Unit/Bed#: E5 -01 Encounter: [id number]      Subjective:     Patient feels well  Patient states that her pain is well controlled and is currently a 5 out of 10  She is voiding without difficulty, she is passing flatus  She is tolerating a regular diet and ambulating without difficulty  She denies any lightheadedness or dizziness, chest pain or palpitations      Vitals: Blood pressure 107/62, pulse (!) 119, temperature 98 5 °F (36 9 °C), resp  rate 18, SpO2 94 %, not currently breastfeeding  ,There is no height or weight on file to calculate BMI  Intake/Output Summary (Last 24 hours) at 4/16/2023 0941  Last data filed at 4/16/2023 0230  Gross per 24 hour   Intake 2600 ml   Output 550 ml   Net 2050 ml       Invasive Devices     Peripheral Intravenous Line  Duration           Peripheral IV 04/15/23 Right Antecubital <1 day                Physical Exam:   Physical Exam  Constitutional:       General: She is not in acute distress  Appearance: She is well-developed  HENT:      Head: Normocephalic and atraumatic  Pulmonary:      Effort: Pulmonary effort is normal  No respiratory distress  Abdominal:      General: A surgical scar is present  There is no distension  Palpations: Abdomen is soft  Tenderness: There is no abdominal tenderness  There is no guarding or rebound  Hernia: No hernia is present  Skin:     Coloration: Skin is not pale  Findings: No rash  Neurological:      Mental Status: She is alert and oriented to person, place, and time  Psychiatric:         Behavior: Behavior normal          Thought Content:  Thought content normal              Labs:   Admission on 04/15/2023   Component Date Value   • WBC 04/15/2023 7 31    • RBC 04/15/2023 3 65 (L)    • Hemoglobin 04/15/2023 10 0 (L)    • Hematocrit 04/15/2023 30 7 (L)    • MCV 04/15/2023 84    • MCH 04/15/2023 27 4    • MCHC 04/15/2023 32 6 • RDW 04/15/2023 14 6    • Platelets 29/95/7405 251    • MPV 04/15/2023 9 8    • ABO Grouping 04/15/2023 A    • Rh Factor 04/15/2023 Positive    • Antibody Screen 04/15/2023 Negative    • Specimen Expiration Date 04/15/2023 [id number]    • Unit Product Code 04/15/2023 [id number]    • Unit Number 04/15/2023 [id number]    • Unit ABO 04/15/2023 A    • Unit [facility] 04/15/2023 POS    • Crossmatch 04/15/2023 Compatible    • Unit Dispense Status 04/15/2023 Crossmatched    • Unit Product Volume 04/15/2023 350    • Unit Product Code 04/15/2023 [id number]    • Unit Number 04/15/2023 [id number]    • Unit ABO 04/15/2023 A    • Unit [facility] 04/15/2023 POS    • Crossmatch 04/15/2023 Compatible    • Unit Dispense Status 04/15/2023 Crossmatched    • Unit Product Volume 04/15/2023 300    • Unit Product Code 04/15/2023 [id number]    • Unit Number 04/15/2023 [id number]    • Unit ABO 04/15/2023 A    • Unit [facility] 04/15/2023 NEG    • Crossmatch 04/15/2023 Compatible    • Unit Dispense Status 04/15/2023 Crossmatched    • Unit Product Volume 04/15/2023 350    • Unit Product Code 04/15/2023 [id number]    • Unit Number 04/15/2023 [id number]    • Unit ABO 04/15/2023 A    • Unit [facility] 04/15/2023 NEG    • Crossmatch 04/15/2023 Compatible    • Unit Dispense Status 04/15/2023 Crossmatched    • Unit Product Volume 04/15/2023 350    • Unit Product Code 04/15/2023 [id number]    • Unit Number 04/15/2023 [id number]    • Unit ABO 04/15/2023 A    • Unit [facility] 04/15/2023 POS    • Crossmatch 04/15/2023 Compatible    • Unit Dispense Status 04/15/2023 Crossmatched    • Unit Product Volume 04/15/2023 350    • Unit Product Code 04/15/2023 [id number]    • Unit Number 04/15/2023 [id number]    • Unit ABO 04/15/2023 A    • Unit [facility] 04/15/2023 POS    • Crossmatch 04/15/2023 Compatible    • Unit Dispense Status 04/15/2023 Crossmatched    • Unit Product Volume 04/15/2023 300    • Unit Product Code 04/15/2023 Q4329N13    • Unit Number 04/15/2023 [id number]    • Unit ABO 04/15/2023 A    • Unit [facility] 04/15/2023 NEG    • Unit Dispense Status 04/15/2023 Return to Inv    • Unit Product Volume 04/15/2023 350    • Unit Product Code 04/15/2023 [id number]    • Unit Number 04/15/2023 [id number]    • Unit ABO 04/15/2023 A    • Unit [facility] 04/15/2023 POS    • Unit Dispense Status 04/15/2023 Return to Inv    • Unit Product Volume 04/15/2023 350    • Unit Product Code 04/15/2023 [id number]    • Unit Number 04/15/2023 [id number]    • Unit ABO 04/15/2023 A    • Unit [facility] 04/15/2023 NEG    • Unit Dispense Status 04/15/2023 Return to Inv    • Unit Product Volume 04/15/2023 350    • Unit Product Code 04/15/2023 [id number]    • Unit Number 04/15/2023 [id number]    • Unit ABO 04/15/2023 A    • Unit [facility] 04/15/2023 POS    • Unit Dispense Status 04/15/2023 Return to Inv    • Unit Product Volume 04/15/2023 300    • WBC 04/16/2023 8 33    • RBC 04/16/2023 3 39 (L)    • Hemoglobin 04/16/2023 9 3 (L)    • Hematocrit 04/16/2023 28 6 (L)    • MCV 04/16/2023 84    • MCH 04/16/2023 27 4    • MCHC 04/16/2023 32 5    • RDW 04/16/2023 14 5    • Platelets 24/86/4440 221    • MPV 04/16/2023 9 7          Results from last 7 days   Lab Units 04/16/23  0655 04/15/23  2229 04/15/23  1854 04/15/23  1218   WBC Thousand/uL 8 33 7 31  --  10 13   HEMOGLOBIN g/dL 9 3* 10 0* 9 8* 11 6   PLATELETS Thousands/uL 221 251  --  283       Imaging and other studies: I have personally reviewed pertinent reports  Patient Active Problem List   Diagnosis   • Acute blood loss anemia (ABLA)   • Hemorrhagic ovarian cyst        A/P: POD 1  Status post laparoscopy and right ovarian cystectomy, evacuation of hemoperitoneum  1) patient's hemoglobin is stable  2) tachycardia postop, EKG obtained yesterday  Patient states that this has been an ongoing issue for the past 2 years  She denies any chest pain or palpitations  Advise follow-up with PCP or cardiology    We will obtain another set of vitals prior to discharge today, no signs of active or ongoing bleeding  3) instructed on follow-up in 1 week  Patient states she has an OB/GYN that she would like to follow-up with Dr Benita Yancey, advised patient to call her office for follow-up  Otherwise, will provide information for OB/GYN care associates  Patient is unable to take pills  We will provide a prescription for Tylenol, Motrin and oxycodone suspension  Advised to be for 1 week for work    Discharge instructions were reviewed with patient

## 2023-04-16 NOTE — PLAN OF CARE
Problem: PAIN - ADULT  Goal: Verbalizes/displays adequate comfort level or baseline comfort level  Description: Interventions:  - Encourage patient to monitor pain and request assistance  - Assess pain using appropriate pain scale  - Administer analgesics based on type and severity of pain and evaluate response  - Implement non-pharmacological measures as appropriate and evaluate response  - Consider cultural and social influences on pain and pain management  - Notify physician/advanced practitioner if interventions unsuccessful or patient reports new pain  Outcome: Progressing     Problem: INFECTION - ADULT  Goal: Absence or prevention of progression during hospitalization  Description: INTERVENTIONS:  - Assess and monitor for signs and symptoms of infection  - Monitor lab/diagnostic results  - Monitor all insertion sites, i e  indwelling lines, tubes, and drains  - Monitor endotracheal if appropriate and nasal secretions for changes in amount and color  - [place] appropriate cooling/warming therapies per order  - Administer medications as ordered  - Instruct and encourage patient and family to use good hand hygiene technique  - Identify and instruct in appropriate isolation precautions for identified infection/condition  Outcome: Progressing     Problem: DISCHARGE PLANNING  Goal: Discharge to home or other facility with appropriate resources  Description: INTERVENTIONS:  - Identify barriers to discharge w/patient and caregiver  - Arrange for needed discharge resources and transportation as appropriate  - Identify discharge learning needs (meds, wound care, etc )  - Arrange for interpretive services to assist at discharge as needed  - Refer to Case Management Department for coordinating discharge planning if the patient needs post-hospital services based on physician/advanced practitioner order or complex needs related to functional status, cognitive ability, or social support system  Outcome: Progressing Problem: Knowledge Deficit  Goal: Patient/family/caregiver demonstrates understanding of disease process, treatment plan, medications, and discharge instructions  Description: Complete learning assessment and assess knowledge base    Interventions:  - Provide teaching at level of understanding  - Provide teaching via preferred learning methods  Outcome: Progressing     Problem: CARDIOVASCULAR - ADULT  Goal: Maintains optimal cardiac output and hemodynamic stability  Description: INTERVENTIONS:  - Monitor I/O, vital signs and rhythm  - Monitor for S/S and trends of decreased cardiac output  - Administer and titrate ordered vasoactive medications to optimize hemodynamic stability  - Assess quality of pulses, skin color and temperature  - Assess for signs of decreased coronary artery perfusion  - Instruct patient to report change in severity of symptoms  Outcome: Progressing     Problem: GASTROINTESTINAL - ADULT  Goal: Minimal or absence of nausea and/or vomiting  Description: INTERVENTIONS:  - Administer IV fluids if ordered to ensure adequate hydration  - Maintain NPO status until nausea and vomiting are resolved  - Nasogastric tube if ordered  - Administer ordered antiemetic medications as needed  - Provide nonpharmacologic comfort measures as appropriate  - Advance diet as tolerated, if ordered  - Consider nutrition services referral to assist patient with adequate nutrition and appropriate food choices  Outcome: Progressing     Problem: METABOLIC, FLUID AND ELECTROLYTES - ADULT  Goal: Electrolytes maintained within normal limits  Description: INTERVENTIONS:  - Monitor labs and assess patient for signs and symptoms of electrolyte imbalances  - Administer electrolyte replacement as ordered  - Monitor response to electrolyte replacements, including repeat lab results as appropriate  - Instruct patient on fluid and nutrition as appropriate  Outcome: Progressing  Goal: Fluid balance maintained  Description: INTERVENTIONS:  - Monitor labs   - Monitor I/O and WT  - Instruct patient on fluid and nutrition as appropriate  - Assess for signs & symptoms of volume excess or deficit  Outcome: Progressing     Problem: SKIN/TISSUE INTEGRITY - ADULT  Goal: Skin Integrity remains intact(Skin Breakdown Prevention)  Description: Assess:  -Perform Jose Luis assessment every shift and prn  -Inspect skin when repositioning, toileting, and assisting with ADLS  -Assess extremities for adequate circulation and sensation     Bed Management:  -Have minimal linens on bed & keep smooth, unwrinkled  -Change linens as needed when moist or perspiring    Toileting:  -Offer bedside commode    Activity:  -Encourage activity and walks on unit  -Encourage or provide ROM exercises   -Use appropriate equipment to lift or move patient in bed    Skin Care:  -Avoid use of baby powder, tape, friction and shearing, hot water or constrictive clothing  -Do not massage red bony areas    Outcome: Progressing     Problem: HEMATOLOGIC - ADULT  Goal: Maintains hematologic stability  Description: INTERVENTIONS  - Assess for signs and symptoms of bleeding or hemorrhage  - Monitor labs  - Administer supportive blood products/factors as ordered and appropriate  Outcome: Progressing     Problem: Nutrition/Hydration-ADULT  Goal: Nutrient/Hydration intake appropriate for improving, restoring or maintaining nutritional needs  Description: Monitor and assess patient's nutrition/hydration status for malnutrition  Collaborate with interdisciplinary team and initiate plan and interventions as ordered  Monitor patient's weight and dietary intake as ordered or per policy  Utilize nutrition screening tool and intervene as necessary  Determine patient's food preferences and provide high-protein, high-caloric foods as appropriate       INTERVENTIONS:  - Monitor oral intake, urinary output, labs, and treatment plans  - Assess nutrition and hydration status and recommend course of action  - Evaluate amount of meals eaten  - Assist patient with eating if necessary   - Allow adequate time for meals  - Recommend/ encourage appropriate diets, oral nutritional supplements, and vitamin/mineral supplements  - Order, calculate, and assess calorie counts as needed  - Recommend, monitor, and adjust tube feedings and TPN/PPN based on assessed needs  - Assess need for intravenous fluids  - Provide specific nutrition/hydration education as appropriate  - Include patient/family/caregiver in decisions related to nutrition  Outcome: Progressing

## 2023-04-16 NOTE — H&P
H&P Exam - Gynecology   Paty Cruz 29 y o  female MRN: [id number]  Unit/Bed#: E5 -01 Encounter: [id number]    Assessment: Paty Cruz is a 29 y o  female who presents for surgical management of her acute blood loss anemia thought to be due to a ruptured hemorrhagic cyst       · Plan to proceed to OR for diagnostic laparoscopy and evacuation of suspected hemoperitoneum  · Ancef 2 g ordered for surgical prophylaxis  · Type and screen ordered with plan to transfuse 1-2 u pRBCs  · Anesthesia aware      History of Present Illness   HPI:  Paty Cruz is a 29 y o  female transferred from [facility] ED for surgical management of her acute blood loss anemia thought to be due to a ruptured hemorrhagic cyst  She was tachycardic in the [facility] ED and reported some SOB and palpitations  Patient was persistently tahcycardic and CT imaging of the abdomen and pelvis was notable for the presence of a cystic structure of the ovary that could be consistent with an endometrioma versus a hemorrhagic cyst  Repeat hemoglobin was conducted and it was noted that there was a decrease in hemoglobin level from 11->9 at which point patient was transferred here  Review of Systems   HENT: Negative  Respiratory: Negative  Gastrointestinal: Positive for abdominal pain  Genitourinary: Negative  Neurological: Negative  Psychiatric/Behavioral: Negative  All other systems reviewed and are negative  Historical Information   No past medical history on file  Past Surgical History:   Procedure Laterality Date   • WISDOM TOOTH EXTRACTION       OB History   No obstetric history on file       Family History   Problem Relation Age of Onset   • No Known Problems Mother      Social History   Social History     Substance and Sexual Activity   Alcohol Use Not Currently     Social History     Substance and Sexual Activity   Drug Use Never     Social History     Tobacco Use   Smoking Status Never   Smokeless Tobacco Never       Meds/Allergies   Medications Prior to Admission   Medication   • mometasone (NASONEX) 50 mcg/act nasal spray   • prednisoLONE (ORAPRED) 15 mg/5 mL oral solution     No Known Allergies    Objective   /62   Pulse (!) 119   Temp 98 5 °F (36 9 °C)   Resp 18   SpO2 94%       Intake/Output Summary (Last 24 hours) at 4/16/2023 0458  Last data filed at 4/16/2023 0034  Gross per 24 hour   Intake 2600 ml   Output 150 ml   Net 2450 ml       Invasive Devices: Invasive Devices     Peripheral Intravenous Line  Duration           Peripheral IV 04/15/23 Right Antecubital <1 day                Physical Exam   GEN: The patient was alert and oriented x3, pleasant well-appearing female in no acute distress     CV: Regular rate  PULM: Non-labored respirations  MSK: Normal gait  Skin: Warm, dry  Neuro: No focal deficits  Psych: Normal affect and judgement, cooperative  Abd: soft, nontender, nondistended    Lab Results:   Admission on 04/15/2023   Component Date Value   • WBC 04/15/2023 7 31    • RBC 04/15/2023 3 65 (L)    • Hemoglobin 04/15/2023 10 0 (L)    • Hematocrit 04/15/2023 30 7 (L)    • MCV 04/15/2023 84    • MCH 04/15/2023 27 4    • MCHC 04/15/2023 32 6    • RDW 04/15/2023 14 6    • Platelets 92/59/6647 251    • MPV 04/15/2023 9 8    • ABO Grouping 04/15/2023 A    • Rh Factor 04/15/2023 Positive    • Antibody Screen 04/15/2023 Negative    • Specimen Expiration Date 04/15/2023 [id number]    • Unit Product Code 04/15/2023 [id number]    • Unit Number 04/15/2023 [id number]    • Unit ABO 04/15/2023 A    • Unit [facility] 04/15/2023 POS    • Crossmatch 04/15/2023 Compatible    • Unit Dispense Status 04/15/2023 Crossmatched    • Unit Product Volume 04/15/2023 350    • Unit Product Code 04/15/2023 [id number]    • Unit Number 04/15/2023 [id number]    • Unit ABO 04/15/2023 A    • Unit [facility] 04/15/2023 POS    • Crossmatch 04/15/2023 Compatible    • Unit Dispense Status 04/15/2023 Crossmatched    • Unit Product Volume 04/15/2023 300    • Unit Product Code 04/15/2023 [id number]    • Unit Number 04/15/2023 [id number]    • Unit ABO 04/15/2023 A    • Unit [facility] 04/15/2023 NEG    • Crossmatch 04/15/2023 Compatible    • Unit Dispense Status 04/15/2023 Crossmatched    • Unit Product Volume 04/15/2023 350    • Unit Product Code 04/15/2023 [id number]    • Unit Number 04/15/2023 [id number]    • Unit ABO 04/15/2023 A    • Unit [facility] 04/15/2023 NEG    • Crossmatch 04/15/2023 Compatible    • Unit Dispense Status 04/15/2023 Crossmatched    • Unit Product Volume 04/15/2023 350    • Unit Product Code 04/15/2023 K8746M25    • Unit Number 04/15/2023 [id number]    • Unit ABO 04/15/2023 A    • Unit [facility] 04/15/2023 POS    • Crossmatch 04/15/2023 Compatible    • Unit Dispense Status 04/15/2023 Crossmatched    • Unit Product Volume 04/15/2023 350    • Unit Product Code 04/15/2023 [id number]    • Unit Number 04/15/2023 [id number]    • Unit ABO 04/15/2023 A    • Unit [facility] 04/15/2023 POS    • Crossmatch 04/15/2023 Compatible    • Unit Dispense Status 04/15/2023 Crossmatched    • Unit Product Volume 04/15/2023 300    • Unit Product Code 04/15/2023 Y6904X93    • Unit Number 04/15/2023 [id number]    • Unit ABO 04/15/2023 A    • Unit [facility] 04/15/2023 NEG    • Unit Dispense Status 04/15/2023 Return to Inv    • Unit Product Volume 04/15/2023 350    • Unit Product Code 04/15/2023 [id number]    • Unit Number 04/15/2023 [id number]    • Unit ABO 04/15/2023 A    • Unit [facility] 04/15/2023 POS    • Unit Dispense Status 04/15/2023 Return to Inv    • Unit Product Volume 04/15/2023 350    • Unit Product Code 04/15/2023 U3140N88    • Unit Number 04/15/2023 [id number]    • Unit ABO 04/15/2023 A    • Unit [facility] 04/15/2023 NEG    • Unit Dispense Status 04/15/2023 Return to Inv    • Unit Product Volume 04/15/2023 350    • Unit Product Code 04/15/2023 [id number]    • Unit Number 04/15/2023 [id number]    • Unit ABO 04/15/2023 A    • Unit [facility] 04/15/2023 POS    • Unit Dispense Status 04/15/2023 Return to Inv    • Unit Product Volume 04/15/2023 300           Tamara Aguero MD  4/16/2023  4:58 AM

## 2023-04-16 NOTE — UTILIZATION REVIEW
Initial Clinical Review    Pt initially presented to [address]  Pt was transferred by EMS to [facility] for a higher level of care for GYN care  Admission: Date/Time/Statement:   Admission Orders (From admission, onward)     Ordered        04/15/23 2223  Inpatient Admission  Once                      Orders Placed This Encounter   Procedures   • Inpatient Admission     Standing Status:   Standing     Number of Occurrences:   1     Order Specific Question:   Level of Care     Answer:   Med Surg [16]     Order Specific Question:   Estimated length of stay     Answer:   More than 2 Midnights     Order Specific Question:   Certification     Answer:   I certify that inpatient services are medically necessary for this patient for a duration of greater than two midnights  See H&P and MD Progress Notes for additional information about the patient's course of treatment  Initial Presentation: 29 y o  female who presented from [address] by EMS to [facility] as a direct admission  Inpatient admission for evaluation and treatment of hemorrhagic ovarian cyst  Presented w/ acute blood loss anemia s/t ruptured hemorrhagic cyst  On exam, tachycardic  Plan: to OR for diagnostic laparoscopy and evacuation of suspected hemoperitoneum, IV ABX, Trend labs, replete electrolytes as needed; NPO      4/15 Surgery  Procedure: LAPAROSCOPY DIAGNOSTIC, RIGHT OVARIAN CYSTECTOMY (N/A)  Indication: Hemorrhagic ovarian cyst [N83 209]  Anesthesia: General      ASA Score: 2 -Emergent  Findings:   • Normal appearing external female genitalia & vaginal mucosa  • Abdomen and pelvis with significant amount of clotted and new blood products  • Following evacuation, a grossly normal appearing uterus, bilateral fallopian tubes and ovaries were seen with the exception of a hemorrhagic cyst on the right ovary  No source of active bleeding seen after cystectomy    • Numerous small clear implants noted on inferior aspect of the spleen  • Remainder of abdominal survey unremarkable  Date: 04/16/23   Day 2: Pt reports pain well controlled  On exam, incision CDI  Pt reports tachycardia has been ongoing for 2 years  Plan: follow up in outpatient in 1 week; oral suspension of analgesics, supportive care          Wt Readings from Last 1 Encounters:   04/15/23 63 5 kg (139 lb 15 9 oz)     Vital Signs:   Date/Time Temp Pulse Resp BP MAP (mmHg) SpO2 O2 Flow Rate (L/min) O2 Device   04/16/23 10:37:42 -- 112 Abnormal  -- 105/66 79 98 % -- --   04/16/23 10:17:14 97 9 °F (36 6 °C) 107 Abnormal  20 108/60 76 98 % -- --   04/16/23 01:06:14 98 5 °F (36 9 °C) 119 Abnormal  18 107/62 77 94 % -- --   04/16/23 0039 99 °F (37 2 °C) 103 16 106/56 -- 99 % -- None (Room air)   04/16/23 0030 -- 115 Abnormal  16 140/72 -- 100 % -- None (Room air)   04/16/23 0022 -- 118 Abnormal  16 -- -- 100 % -- None (Room air)   04/16/23 0018 98 2 °F (36 8 °C) 121 Abnormal  16 114/91 -- 100 % 4 L/min Nasal cannula   04/15/23 22:18:28 98 4 °F (36 9 °C) 108 Abnormal  16 117/80 92 97 % -- None (Room air)       Pertinent Labs/Diagnostic Test Results:   Results from last 7 days   Lab Units 04/16/23  0655 04/15/23  2229 04/15/23  1854 04/15/23  1218   WBC Thousand/uL 8 33 7 31  --  10 13   HEMOGLOBIN g/dL 9 3* 10 0* 9 8* 11 6   HEMATOCRIT % 28 6* 30 7* 29 2* 35 0   PLATELETS Thousands/uL 221 251  --  283   NEUTROS ABS Thousands/µL  --   --   --  8 36*         Results from last 7 days   Lab Units 04/15/23  1218   SODIUM mmol/L 134*   POTASSIUM mmol/L 3 9   CHLORIDE mmol/L 101   CO2 mmol/L 23   ANION GAP mmol/L 10   BUN mg/dL 12   CREATININE mg/dL 0 56*   EGFR ml/min/1 73sq m 125   CALCIUM mg/dL 9 1     Results from last 7 days   Lab Units 04/15/23  1218   AST U/L 13   ALT U/L 10   ALK PHOS U/L 58   TOTAL PROTEIN g/dL 7 1   ALBUMIN g/dL 4 3   TOTAL BILIRUBIN mg/dL 0 73         Results from last 7 days   Lab Units 04/15/23  1218   GLUCOSE RANDOM mg/dL 103       Results from last 7 days   Lab Units 04/15/23  1218   HS TNI 0HR ng/L <2     Results from last 7 days   Lab Units 04/15/23  1232   D-DIMER QUANTITATIVE ug/ml FEU 1 49*     Results from last 7 days   Lab Units 04/15/23  1951   PROTIME seconds 14 0   INR  1 06   PTT seconds 25     Results from last 7 days   Lab Units 04/15/23  1218   TSH 3RD GENERATON uIU/mL 1 487       Results from last 7 days   Lab Units 04/15/23  1218   LIPASE u/L 28     Results from last 7 days   Lab Units 04/15/23  1534   CLARITY UA  Clear   COLOR UA  Yellow   SPEC GRAV UA  1 010   PH UA  5 0   GLUCOSE UA mg/dl Negative   KETONES UA mg/dl 15 (1+)*   BLOOD UA  Negative   PROTEIN UA mg/dl Trace*   NITRITE UA  Negative   BILIRUBIN UA  Negative   UROBILINOGEN UA E U /dl 0 2   LEUKOCYTES UA  Negative   WBC UA /hpf None Seen   RBC UA /hpf None Seen   BACTERIA UA /hpf Occasional   EPITHELIAL CELLS WET PREP /hpf Occasional         Present on Admission:  • Hemorrhagic ovarian cyst      Admitting Diagnosis: Hemorrhagic ovarian cyst [N83 209]  Age/Sex: 29 y o  female  Admission Orders:  NPO  -- Regular Diet  I&O  SCDs  Scheduled Medications:  Acetaminophen, 650 mg, Oral, Q6H    Continuous IV Infusions:  lactated ringers, 125 mL/hr, Intravenous, Continuous    PRN Meds:  ketorolac, 15 mg, Intravenous, Q6H PRN  ondansetron, 4 mg, Intravenous, Q6H PRN; 4/15 x1   oxyCODONE, 5 mg, Oral, Q4H PRN        Network Utilization Review Department  ATTENTION: Please call with any questions or concerns to [phone] and carefully listen to the prompts so that you are directed to the right person  All voicemails are confidential   Keren Driscoll all requests for admission clinical reviews, approved or denied determinations and any other requests to dedicated fax number below belonging to the campus where the patient is receiving treatment   List of dedicated fax numbers for the Facilities:  FACILITY NAME ROXANNE Stern 25 DENIALS (Administrative/Medical Necessity) [phone] [address]  (Maternity/NICU/Pediatrics) [address] [place] [address] [phone]   [address] [place] [address] E[facility]Len[facility] Hospitalrom[facility] [address] [facility] 134 815 [facility] [phone]

## 2023-04-16 NOTE — OP NOTE
OPERATIVE REPORT  PATIENT NAME: Hayes Pettit    :  1990  MRN: [id number]  Pt Location: [facility] OR ROOM 05    SURGERY DATE: 4/15/2023    Surgeon(s) and Role:     * Nicholas Florez DO - Primary     * Clint Kirk MD - Assisting    Preop Diagnosis:  Hemorrhagic ovarian cyst [N83 209]    Post-Op Diagnosis Codes:     * Hemorrhagic ovarian cyst [V35 620]    Procedure(s) (LRB):  LAPAROSCOPY DIAGNOSTIC, RIGHT OVARIAN CYSTECTOMY (N/A)    Specimen(s):  ID Type Source Tests Collected by Time Destination   1 : right ovarian cyst wall  Tissue Ovary, Cyst TISSUE EXAM Nicholas Florez DO 4/15/2023 2333        Operative Findings  · Normal appearing external female genitalia  · Normal appearing vaginal mucosa  · Abdomen and pelvis with significant amount of clotted and new blood products  · Following evacuation, a grossly normal appearing uterus, bilateral fallopian tubes and ovaries were seen with the exception of a hemorrhagic cyst on the right ovary  No source of active bleeding seen after cystectomy  · Numerous small clear implants noted on inferior aspect of the spleen (see op images)  · Remainder of abdominal survey unremarkable  Description of Procedure    Patient was taken to the operating room  General endotracheal anesthesia (GET) was administered and the patient was positioned on the OR table in the dorsal lithotomy position  All pressure points were padded and a loraine hugger was placed to maintain control of core body temperature  The patient was prepped and draped in the usual sterile fashion with chloroprep on the abdomen and chlorhexidine prep on the vagina and perineum  Operative Technique    A time out was performed to confirm correct patient and correct procedure  A straight catheter was introduced into the bladder, which was drained of 150cc of clear yellow urine  A sponge stick was inserted into the vagina  Sterile gloves were then exchanged and attention was turned to the abdomen       A 5mm incision was made at the inferior edge of the umbilicus for introduction of a 5mm trocar  Trocar was introduced under direct visualization  Pneumoperitoneum was then established to a maximum of 15mmHg  The entire pelvis was noted to be filled with blood but there was no evidence of direct trocar injury  Patient was placed in Trendelenburg  Two additional port sites were selected in the left and right lower abdomen approximately 2cm superior and medial to the iliac crests  A 5mm incision was made for introduction of a 5mm trocar under direct visualization at each site  A pelvic survey was completed and findings are as noted above  The blood products were evacuated using the suction   The left ovary was elevated and the hemorrhagic cyst was drained  The cyst wall was then removed from the ovary via traction-countertraction and the site was irrigated with good hemostasis seen  Remaining findings are noted above  Pneumoperitoneum was allowed to escape  Adequate hemostasis was visualized  The inferior trocars were removed under direct visualization  The laparoscope was withdrawn from the abdomen, followed by its trocar sleeve at the umbilicus  Skin incisions were closed with 4-0 Vicryl and Exofin  The sponge stick was then removed from the vagina  At the conclusion of the procedure, all needle, sponge, and instrument counts were noted to be correct x2  Patient tolerated the procedure well and was transferred to PACU in stable condition prior to discharge with follow up in 1-2 weeks  Dr Amadou Ag was present and participated in all key portions of the case        SIGNATURE: Husam Lemus MD  DATE: April 16, 2023  TIME: 8:42 AM

## 2023-04-17 NOTE — UTILIZATION REVIEW
NOTIFICATION OF ADMISSION DISCHARGE   This is a Notification of Discharge from [address]  Please be advised that this patient has been discharge from our facility  Below you will find the admission and discharge date and time including the patient’s disposition  UTILIZATION REVIEW CONTACT:  Clara Ann  Utilization   Network Utilization Review Department  Phone: [phone] x carefully listen to the prompts  All voicemails are confidential   Email: [email or web address]     ADMISSION INFORMATION  PRESENTATION DATE: 4/15/2023 10:09 PM  OBERVATION ADMISSION DATE:   INPATIENT ADMISSION DATE: 4/15/23 10:09 PM   DISCHARGE DATE: 4/16/2023  2:00 PM   DISPOSITION:Home/Self Care    IMPORTANT INFORMATION:  Send all requests for admission clinical reviews, approved or denied determinations and any other requests to dedicated fax number below belonging to the campus where the patient is receiving treatment   List of dedicated fax numbers:  [address] DENIALS (Administrative/Medical Necessity) [phone]   [address] (Maternity/NICU/Pediatrics) [phone]   [facility] [phone]   [facility] 87 [phone]   [address] [phone]   220 [facility] [phone] 90 [facility] [phone]   [address] [place], [place] [address]janyLists of hospitals in the [place] 119 [phone]   [facility]  [phone] 4058 [facility] [phone]   412 [facility] 850 [address] [facility] [phone]

## 2023-04-18 LAB
ABO GROUP BLD BPU: NORMAL
ABO GROUP BLD BPU: NORMAL
BPU ID: NORMAL
BPU ID: NORMAL
CROSSMATCH: NORMAL
CROSSMATCH: NORMAL
UNIT DISPENSE STATUS: NORMAL
UNIT DISPENSE STATUS: NORMAL
UNIT PRODUCT CODE: NORMAL
UNIT PRODUCT CODE: NORMAL
UNIT PRODUCT VOLUME: 300 ML
UNIT PRODUCT VOLUME: 350 ML
UNIT RH: NORMAL
UNIT RH: NORMAL

## 2023-05-18 ENCOUNTER — OFFICE VISIT (OUTPATIENT)
Dept: URGENT CARE | Facility: MEDICAL CENTER | Age: 33
End: 2023-05-18

## 2023-05-18 VITALS
HEART RATE: 87 BPM | BODY MASS INDEX: 22.5 KG/M2 | HEIGHT: 66 IN | TEMPERATURE: 98.8 F | RESPIRATION RATE: 20 BRPM | WEIGHT: 140 LBS | OXYGEN SATURATION: 99 %

## 2023-05-18 DIAGNOSIS — H93.8X1 EAR FULLNESS, RIGHT: Primary | ICD-10-CM

## 2023-05-18 RX ORDER — FLUTICASONE PROPIONATE 50 MCG
1 SPRAY, SUSPENSION (ML) NASAL DAILY
Qty: 11.1 ML | Refills: 0 | Status: SHIPPED | OUTPATIENT
Start: 2023-05-18

## 2023-05-18 NOTE — PROGRESS NOTES
[facility]        NAME: Mac Marroquin is a 29 y o  female  : 1990    MRN: [id number]  DATE: May 18, 2023  TIME: 2:11 PM    Assessment and Plan   Ear fullness, right [H93 8X1]  1  Ear fullness, right  fluticasone (FLONASE) 50 mcg/act nasal spray        No signs of infection or need for antibiotics at this time  Use Flonase and allergy medication at this time  Follow up if symptoms worsen  Patient Instructions     Use Flonase   Can take OTC allergy medication such as Zyrtec, Allegra or Claritin  Tylenol or ibuprofen as needed for pain or fever  Follow up with PCP in 3-5 days  Proceed to  ER if symptoms worsen  Chief Complaint     Chief Complaint   Patient presents with   • Ear Fullness     Pt  C/p right ear pain and fullness, decreased hearing, hx of ruptured ear drum, pt  Woke up and ear was normal, pt  Started with pain at work          History of Present Illness       Ear Fullness   There is pain in the right ear  This is a new problem  The current episode started today  There has been no fever  Pertinent negatives include no coughing, ear discharge, headaches, rhinorrhea or sore throat  She has tried nothing for the symptoms  hx of ruptured ear drum 6 months ago       Review of Systems   Review of Systems   Constitutional: Negative for appetite change, chills and fever  HENT: Positive for ear pain  Negative for congestion, ear discharge, postnasal drip, rhinorrhea, sinus pressure, sore throat and trouble swallowing  Respiratory: Negative for cough, chest tightness, shortness of breath and wheezing  Cardiovascular: Negative for chest pain  Skin: Negative for color change and pallor  Neurological: Negative for dizziness, light-headedness and headaches  Current Medications       Current Outpatient Medications:   •  fluticasone (FLONASE) 50 mcg/act nasal spray, 1 spray into each nostril daily, Disp: 11 1 mL, Rfl: 0  •  Acetaminophen (TYLENOL) 650 mg/20  3mL "suspension, Take 20 3 mL (650 mg total) by mouth every 6 (six) hours as needed for mild pain, Disp: 355 mL, Rfl: 1  •  ibuprofen (MOTRIN) 100 mg/5 mL suspension, Take 20 mL (400 mg total) by mouth every 6 (six) hours as needed for moderate pain, Disp: 273 mL, Rfl: 0    Current Allergies     Allergies as of 05/18/2023   • (No Known Allergies)            The following portions of the patient's history were reviewed and updated as appropriate: allergies, current medications, past family history, past medical history, past social history, past surgical history and problem list      History reviewed  No pertinent past medical history  Past Surgical History:   Procedure Laterality Date   • ID LAPS ABD PRTM&OMENTUM DX W/WO SPEC BR/WA SPX N/A 4/15/2023    Procedure: LAPAROSCOPY DIAGNOSTIC, RIGHT OVARIAN CYSTECTOMY;  Surgeon: Gene Recinos DO;  Location: [facility] OR;  Service: Gynecology   • WISDOM TOOTH EXTRACTION         Family History   Problem Relation Age of Onset   • No Known Problems Mother          Medications have been verified  Objective   Pulse 87   Temp 98 8 °F (37 1 °C)   Resp 20   Ht 5' 6\" (1 676 m)   Wt 63 5 kg (140 lb)   LMP 05/13/2023   SpO2 99%   BMI 22 60 kg/m²        Physical Exam     Physical Exam  Constitutional:       General: She is not in acute distress  Appearance: Normal appearance  She is not ill-appearing  HENT:      Head: Normocephalic  Right Ear: Tympanic membrane and external ear normal       Left Ear: Tympanic membrane and external ear normal       Nose: No congestion  Mouth/Throat:      Mouth: Mucous membranes are moist       Pharynx: Oropharynx is clear  Cardiovascular:      Rate and Rhythm: Normal rate and regular rhythm  Pulses: Normal pulses  Heart sounds: Normal heart sounds  Pulmonary:      Effort: Pulmonary effort is normal  No respiratory distress  Breath sounds: Normal breath sounds  No stridor  No wheezing, rhonchi or rales   " Lymphadenopathy:      Cervical: No cervical adenopathy  Skin:     General: Skin is warm and dry  Neurological:      General: No focal deficit present  Mental Status: She is alert and oriented to person, place, and time  Mental status is at baseline  Psychiatric:         Mood and Affect: Mood normal          Behavior: Behavior normal          Thought Content:  Thought content normal          Judgment: Judgment normal

## 2023-05-18 NOTE — PATIENT INSTRUCTIONS
Use Flonase   Can take OTC allergy medication such as Zyrtec, Allegra or Claritin  Tylenol or ibuprofen as needed for pain or fever  Follow up with PCP in 3-5 days  Proceed to  ER if symptoms worsen

## 2024-03-12 NOTE — PROGRESS NOTES
[facility]        NAME: William Reyez is a 34 y o  female  : 1990    MRN: [id number]  DATE: 2018  TIME: 12:41 PM    Assessment and Plan   Acute laryngitis [J04 0]  1  Acute laryngitis  prednisoLONE (ORAPRED) 15 mg/5 mL oral solution         Patient Instructions       Follow up with PCP in 3-5 days  Proceed to  ER if symptoms worsen  Chief Complaint     Chief Complaint   Patient presents with    Cold Like Symptoms     loss of voice for few days         History of Present Illness       URI    This is a new problem  The current episode started in the past 7 days  The problem has been unchanged  There has been no fever  Associated symptoms include coughing and a sore throat  Review of Systems   Review of Systems   Constitutional: Negative  HENT: Positive for sore throat  Respiratory: Positive for cough  Cardiovascular: Negative  Current Medications       Current Outpatient Prescriptions:     prednisoLONE (ORAPRED) 15 mg/5 mL oral solution, Take 15 mL (45 mg total) by mouth daily for 5 days, Disp: 75 mL, Rfl: 0    Current Allergies     Allergies as of 2018    (No Known Allergies)            The following portions of the patient's history were reviewed and updated as appropriate: allergies, current medications, past family history, past medical history, past social history, past surgical history and problem list      History reviewed  No pertinent past medical history  History reviewed  No pertinent surgical history  No family history on file  Medications have been verified  Objective   /64   Pulse 95   Temp 98 7 °F (37 1 °C) (Tympanic)   Resp 20   Ht 5' 6" (1 676 m)   Wt 59 9 kg (132 lb)   LMP 2018 (Exact Date)   SpO2 98%   BMI 21 31 kg/m²        Physical Exam     Physical Exam   Constitutional: She is oriented to person, place, and time  She appears well-developed and well-nourished     HENT:   Right Ear: Done pt notified.   External ear normal    Left Ear: External ear normal    Nose: Nose normal    Mouth/Throat: Oropharynx is clear and moist  No oropharyngeal exudate  Eyes: Conjunctivae are normal    Neck: Normal range of motion  Neck supple  Cardiovascular: Normal rate, regular rhythm and normal heart sounds  No murmur heard  Pulmonary/Chest: Effort normal and breath sounds normal  No respiratory distress  She has no wheezes  She has no rales  She exhibits no tenderness  Abdominal: Soft  Musculoskeletal: Normal range of motion  Lymphadenopathy:     She has no cervical adenopathy  Neurological: She is alert and oriented to person, place, and time  Skin: Skin is warm  No rash noted  No erythema

## 2024-08-09 ENCOUNTER — OFFICE VISIT (OUTPATIENT)
Dept: INTERNAL MEDICINE CLINIC | Facility: CLINIC | Age: 34
End: 2024-08-09
Payer: COMMERCIAL

## 2024-08-09 DIAGNOSIS — M62.838 MUSCLE SPASM: ICD-10-CM

## 2024-08-09 DIAGNOSIS — M25.512 ACUTE PAIN OF LEFT SHOULDER: Primary | ICD-10-CM

## 2024-08-09 PROCEDURE — 99213 OFFICE O/P EST LOW 20 MIN: CPT | Performed by: NURSE PRACTITIONER

## 2024-08-09 NOTE — PROGRESS NOTES
Ambulatory Visit  Name: Eryn Villegas      : 1990      MRN: [id number]  Encounter Provider: JOSE Charles  Encounter Date: 2024   Encounter department: [facility]    Assessment & Plan   1. Acute pain of left shoulder  2. Muscle spasm     Did verbally call in Flexeril and Medrol for back pain. Did advise if any chest pain or worsening of symptoms to go to ER. Will bring back for her routine visit.    History of Present Illness     Eryn is for an acute visit she has been having left shoulder pain and pain in the upper back. She states it feels like her arm is numb and at night feels it is asleep. She is using heat and states nothing is helping. She denies any chest pain, SOB, or palpitations. She offers no other issues.        Review of Systems   Musculoskeletal:  Positive for back pain and neck pain.   All other systems reviewed and are negative.      Objective     There were no vitals taken for this visit.    Physical Exam  Vitals reviewed.   Constitutional:       Appearance: Normal appearance. She is normal weight.   Cardiovascular:      Rate and Rhythm: Normal rate and regular rhythm.      Pulses: Normal pulses.      Heart sounds: Normal heart sounds.   Pulmonary:      Effort: Pulmonary effort is normal.      Breath sounds: Normal breath sounds.   Musculoskeletal:         General: Tenderness present. Normal range of motion.   Skin:     General: Skin is warm and dry.   Neurological:      General: No focal deficit present.      Mental Status: She is alert and oriented to person, place, and time. Mental status is at baseline.   Psychiatric:         Mood and Affect: Mood normal.         Behavior: Behavior normal.         Thought Content: Thought content normal.         Judgment: Judgment normal.       Administrative Statements

## 2024-10-15 ENCOUNTER — OFFICE VISIT (OUTPATIENT)
Dept: INTERNAL MEDICINE CLINIC | Facility: CLINIC | Age: 34
End: 2024-10-15
Payer: COMMERCIAL

## 2024-10-15 VITALS
TEMPERATURE: 98.3 F | BODY MASS INDEX: 27.07 KG/M2 | OXYGEN SATURATION: 99 % | WEIGHT: 168.44 LBS | HEIGHT: 66 IN | DIASTOLIC BLOOD PRESSURE: 72 MMHG | HEART RATE: 94 BPM | SYSTOLIC BLOOD PRESSURE: 122 MMHG

## 2024-10-15 DIAGNOSIS — H65.191 ACUTE EFFUSION OF RIGHT EAR: Primary | ICD-10-CM

## 2024-10-15 DIAGNOSIS — H93.8X1 EAR FULLNESS, RIGHT: ICD-10-CM

## 2024-10-15 PROBLEM — A60.9 HSV (HERPES SIMPLEX VIRUS) ANOGENITAL INFECTION: Status: ACTIVE | Noted: 2023-06-22

## 2024-10-15 PROBLEM — M25.512 ACUTE PAIN OF LEFT SHOULDER: Status: RESOLVED | Noted: 2024-08-09 | Resolved: 2024-10-15

## 2024-10-15 PROCEDURE — 99214 OFFICE O/P EST MOD 30 MIN: CPT | Performed by: NURSE PRACTITIONER

## 2024-10-15 RX ORDER — PREDNISONE 10 MG/1
TABLET ORAL
Qty: 18 TABLET | Refills: 0 | Status: SHIPPED | OUTPATIENT
Start: 2024-10-15

## 2024-10-15 RX ORDER — FLUTICASONE PROPIONATE 50 MCG
1 SPRAY, SUSPENSION (ML) NASAL DAILY
Qty: 11.1 ML | Refills: 0 | Status: SHIPPED | OUTPATIENT
Start: 2024-10-15

## 2024-10-15 NOTE — ASSESSMENT & PLAN NOTE
Orders:    predniSONE 10 mg tablet; 30 mg by mouth daily for 3 days, then 20 mg by mouth daily for 3 days, then 10 mg by mouth daily for 3 days, then stop

## 2024-10-15 NOTE — ASSESSMENT & PLAN NOTE
Orders:    fluticasone (FLONASE) 50 mcg/act nasal spray; 1 spray into each nostril daily    predniSONE 10 mg tablet; 30 mg by mouth daily for 3 days, then 20 mg by mouth daily for 3 days, then 10 mg by mouth daily for 3 days, then stop

## 2024-10-15 NOTE — PROGRESS NOTES
Ambulatory Visit  Name: Eryn Villegas      : 1990      MRN: [id number]  Encounter Provider: JOSE Charles  Encounter Date: 10/15/2024   Encounter department: [facility]    Assessment & Plan  Acute effusion of right ear    Orders:    predniSONE 10 mg tablet; 30 mg by mouth daily for 3 days, then 20 mg by mouth daily for 3 days, then 10 mg by mouth daily for 3 days, then stop    Ear fullness, right    Orders:    fluticasone (FLONASE) 50 mcg/act nasal spray; 1 spray into each nostril daily    predniSONE 10 mg tablet; 30 mg by mouth daily for 3 days, then 20 mg by mouth daily for 3 days, then 10 mg by mouth daily for 3 days, then stop     Will start on Flonase and Prednisone tapered dose. If symptoms worsen did advise to call the office. Will bring back for her wellness.    History of Present Illness     Eryn is for an acute visit. She is having right ear pain and fullness. She denies other symptoms. She states the last time she had this her ear drum ruptured. She just wanted to be checked. She offers no other issues.    Earache   There is pain in the right ear. This is a recurrent problem. The current episode started yesterday. The problem occurs every few minutes. The problem has been gradually worsening. There has been no fever. The fever has been present for Less than 1 day. The pain is at a severity of 4/10. Associated symptoms include hearing loss. Pertinent negatives include no abdominal pain, coughing, diarrhea, ear discharge, headaches, neck pain, rash, rhinorrhea, sore throat or vomiting.         Review of Systems   HENT:  Positive for ear pain and hearing loss. Negative for ear discharge, rhinorrhea and sore throat.    Respiratory:  Negative for cough.    Gastrointestinal:  Negative for abdominal pain, diarrhea and vomiting.   Musculoskeletal:  Negative for neck pain.   Skin:  Negative for rash.   Neurological:  Negative for headaches.           Objective  "    /72 (BP Location: Left arm, Patient Position: Sitting)   Pulse 94   Temp 98.3 °F (36.8 °C) (Temporal)   Ht 5' 6\" (1.676 m)   Wt 76.4 kg (168 lb 7 oz)   SpO2 99%   BMI 27.19 kg/m²     Physical Exam  Vitals reviewed.   Constitutional:       Appearance: Normal appearance. She is normal weight.   HENT:      Right Ear: Tympanic membrane, ear canal and external ear normal.      Left Ear: Tympanic membrane, ear canal and external ear normal.   Skin:     General: Skin is warm and dry.      Capillary Refill: Capillary refill takes less than 2 seconds.   Neurological:      General: No focal deficit present.      Mental Status: She is alert and oriented to person, place, and time. Mental status is at baseline.   Psychiatric:         Mood and Affect: Mood normal.         Behavior: Behavior normal.         Thought Content: Thought content normal.         Judgment: Judgment normal.         "

## 2024-10-28 ENCOUNTER — OFFICE VISIT (OUTPATIENT)
Dept: INTERNAL MEDICINE CLINIC | Facility: CLINIC | Age: 34
End: 2024-10-28
Payer: COMMERCIAL

## 2024-10-28 VITALS
BODY MASS INDEX: 27.61 KG/M2 | SYSTOLIC BLOOD PRESSURE: 118 MMHG | OXYGEN SATURATION: 98 % | WEIGHT: 171.8 LBS | DIASTOLIC BLOOD PRESSURE: 78 MMHG | HEART RATE: 87 BPM | TEMPERATURE: 98.7 F | HEIGHT: 66 IN

## 2024-10-28 DIAGNOSIS — R63.5 WEIGHT GAIN: ICD-10-CM

## 2024-10-28 DIAGNOSIS — H65.191 ACUTE EFFUSION OF RIGHT EAR: ICD-10-CM

## 2024-10-28 DIAGNOSIS — Z13.6 SCREENING FOR CARDIOVASCULAR CONDITION: ICD-10-CM

## 2024-10-28 DIAGNOSIS — F41.9 ANXIETY: ICD-10-CM

## 2024-10-28 DIAGNOSIS — Z00.00 ROUTINE ADULT HEALTH MAINTENANCE: Primary | ICD-10-CM

## 2024-10-28 PROBLEM — M62.838 MUSCLE SPASM: Status: RESOLVED | Noted: 2024-08-09 | Resolved: 2024-10-28

## 2024-10-28 PROCEDURE — 99395 PREV VISIT EST AGE 18-39: CPT | Performed by: NURSE PRACTITIONER

## 2024-10-28 NOTE — PROGRESS NOTES
Ambulatory Visit  Name: Eryn Villegas      : 1990      MRN: [id number]  Encounter Provider: JOSE Charles  Encounter Date: 10/28/2024   Encounter department: [facility]    Assessment & Plan  Acute effusion of right ear    Orders:    Comprehensive metabolic panel; Future    CBC and differential; Future    TSH, 3rd generation with Free T4 reflex; Future    Routine adult health maintenance    Orders:    Comprehensive metabolic panel; Future    CBC and differential; Future    TSH, 3rd generation with Free T4 reflex; Future    Anxiety    Orders:    Comprehensive metabolic panel; Future    CBC and differential; Future    TSH, 3rd generation with Free T4 reflex; Future    Screening for cardiovascular condition    Orders:    Lipid panel; Future    Weight gain    Orders:    Ambulatory Referral to Weight Management; Future     Will order fasting labs. Will refer to weight management. If symptoms persist with ear will refer back to ENT. Continue Flonase. Did advise following up with GYN. Will follow up in one year or sooner if need be.    History of Present Illness     Eryn is for a wellness. Her ear is feeling better and was hearing good yesterday but today is having some muffled hearing. She continues to use the Flonase. She denies any chest pain, SOB, or palpitations. She denies any depression but has some anxiety. She does not feels she needs anything. She is having some issues with her menses and her first day is very heavy. She does see Dr. Contreras. She is concerned about her weight and is willing to see weight management. She at times when driving or just random feels her neck pops out by her throat and she has to push on this for it go back in. She states it happens on occasion. She offers no other issues.          Review of Systems   Genitourinary:  Positive for menstrual problem.   All other systems reviewed and are negative.          Objective     /78 (BP  "Location: Left arm, Patient Position: Sitting, Cuff Size: Large)   Pulse 87   Temp 98.7 °F (37.1 °C) (Temporal)   Ht 5' 6\" (1.676 m)   Wt 77.9 kg (171 lb 12.8 oz)   SpO2 98%   BMI 27.73 kg/m²     Physical Exam  Vitals reviewed.   Constitutional:       Appearance: Normal appearance. She is normal weight.   HENT:      Head: Normocephalic and atraumatic.      Right Ear: Tympanic membrane, ear canal and external ear normal.      Left Ear: Tympanic membrane, ear canal and external ear normal.      Nose: Nose normal.      Mouth/Throat:      Mouth: Mucous membranes are moist.      Pharynx: Oropharynx is clear.   Cardiovascular:      Rate and Rhythm: Normal rate and regular rhythm.      Pulses: Normal pulses.      Heart sounds: Normal heart sounds.   Pulmonary:      Effort: Pulmonary effort is normal.      Breath sounds: Normal breath sounds.   Abdominal:      General: Abdomen is flat. Bowel sounds are normal.      Palpations: Abdomen is soft.   Musculoskeletal:         General: Normal range of motion.      Cervical back: Normal range of motion and neck supple.   Skin:     General: Skin is warm and dry.      Capillary Refill: Capillary refill takes less than 2 seconds.   Neurological:      General: No focal deficit present.      Mental Status: She is alert and oriented to person, place, and time. Mental status is at baseline.   Psychiatric:         Mood and Affect: Mood normal.         Behavior: Behavior normal.         Thought Content: Thought content normal.         Judgment: Judgment normal.         "

## 2024-11-02 ENCOUNTER — APPOINTMENT (OUTPATIENT)
Dept: LAB | Facility: CLINIC | Age: 34
End: 2024-11-02
Payer: COMMERCIAL

## 2024-11-02 DIAGNOSIS — Z00.00 ROUTINE ADULT HEALTH MAINTENANCE: ICD-10-CM

## 2024-11-02 DIAGNOSIS — Z13.6 SCREENING FOR CARDIOVASCULAR CONDITION: ICD-10-CM

## 2024-11-02 DIAGNOSIS — H65.191 ACUTE EFFUSION OF RIGHT EAR: ICD-10-CM

## 2024-11-02 DIAGNOSIS — F41.9 ANXIETY: ICD-10-CM

## 2024-11-02 LAB
ALBUMIN SERPL BCG-MCNC: 4.3 G/DL (ref 3.5–5)
ALP SERPL-CCNC: 89 U/L (ref 34–104)
ALT SERPL W P-5'-P-CCNC: 20 U/L (ref 7–52)
ANION GAP SERPL CALCULATED.3IONS-SCNC: 8 MMOL/L (ref 4–13)
AST SERPL W P-5'-P-CCNC: 19 U/L (ref 13–39)
BASOPHILS # BLD AUTO: 0.05 THOUSANDS/ΜL (ref 0–0.1)
BASOPHILS NFR BLD AUTO: 1 % (ref 0–1)
BILIRUB SERPL-MCNC: 0.66 MG/DL (ref 0.2–1)
BUN SERPL-MCNC: 10 MG/DL (ref 5–25)
CALCIUM SERPL-MCNC: 8.9 MG/DL (ref 8.4–10.2)
CHLORIDE SERPL-SCNC: 100 MMOL/L (ref 96–108)
CHOLEST SERPL-MCNC: 257 MG/DL
CO2 SERPL-SCNC: 29 MMOL/L (ref 21–32)
CREAT SERPL-MCNC: 0.59 MG/DL (ref 0.6–1.3)
EOSINOPHIL # BLD AUTO: 0.11 THOUSAND/ΜL (ref 0–0.61)
EOSINOPHIL NFR BLD AUTO: 2 % (ref 0–6)
ERYTHROCYTE [DISTWIDTH] IN BLOOD BY AUTOMATED COUNT: 14.8 % (ref 11.6–15.1)
GFR SERPL CREATININE-BSD FRML MDRD: 121 ML/MIN/1.73SQ M
GLUCOSE P FAST SERPL-MCNC: 97 MG/DL (ref 65–99)
HCT VFR BLD AUTO: 43.9 % (ref 34.8–46.1)
HDLC SERPL-MCNC: 86 MG/DL
HGB BLD-MCNC: 14.2 G/DL (ref 11.5–15.4)
IMM GRANULOCYTES # BLD AUTO: 0.02 THOUSAND/UL (ref 0–0.2)
IMM GRANULOCYTES NFR BLD AUTO: 0 % (ref 0–2)
LDLC SERPL CALC-MCNC: 153 MG/DL (ref 0–100)
LYMPHOCYTES # BLD AUTO: 2.22 THOUSANDS/ΜL (ref 0.6–4.47)
LYMPHOCYTES NFR BLD AUTO: 36 % (ref 14–44)
MCH RBC QN AUTO: 28.1 PG (ref 26.8–34.3)
MCHC RBC AUTO-ENTMCNC: 32.3 G/DL (ref 31.4–37.4)
MCV RBC AUTO: 87 FL (ref 82–98)
MONOCYTES # BLD AUTO: 0.36 THOUSAND/ΜL (ref 0.17–1.22)
MONOCYTES NFR BLD AUTO: 6 % (ref 4–12)
NEUTROPHILS # BLD AUTO: 3.44 THOUSANDS/ΜL (ref 1.85–7.62)
NEUTS SEG NFR BLD AUTO: 55 % (ref 43–75)
NONHDLC SERPL-MCNC: 171 MG/DL
NRBC BLD AUTO-RTO: 0 /100 WBCS
PLATELET # BLD AUTO: 355 THOUSANDS/UL (ref 149–390)
PMV BLD AUTO: 10.2 FL (ref 8.9–12.7)
POTASSIUM SERPL-SCNC: 4.2 MMOL/L (ref 3.5–5.3)
PROT SERPL-MCNC: 7.6 G/DL (ref 6.4–8.4)
RBC # BLD AUTO: 5.05 MILLION/UL (ref 3.81–5.12)
SODIUM SERPL-SCNC: 137 MMOL/L (ref 135–147)
TRIGL SERPL-MCNC: 88 MG/DL
TSH SERPL DL<=0.05 MIU/L-ACNC: 2.36 UIU/ML (ref 0.45–4.5)
WBC # BLD AUTO: 6.2 THOUSAND/UL (ref 4.31–10.16)

## 2024-11-02 PROCEDURE — 80061 LIPID PANEL: CPT

## 2024-11-02 PROCEDURE — 80053 COMPREHEN METABOLIC PANEL: CPT

## 2024-11-02 PROCEDURE — 85025 COMPLETE CBC W/AUTO DIFF WBC: CPT

## 2024-11-02 PROCEDURE — 36415 COLL VENOUS BLD VENIPUNCTURE: CPT

## 2024-11-02 PROCEDURE — 84443 ASSAY THYROID STIM HORMONE: CPT

## 2024-11-14 PROBLEM — H65.191 ACUTE EFFUSION OF RIGHT EAR: Status: RESOLVED | Noted: 2024-10-15 | Resolved: 2024-11-14

## 2024-11-27 PROBLEM — Z13.6 SCREENING FOR CARDIOVASCULAR CONDITION: Status: RESOLVED | Noted: 2024-10-28 | Resolved: 2024-11-27

## 2024-12-12 ENCOUNTER — VBI (OUTPATIENT)
Dept: ADMINISTRATIVE | Facility: OTHER | Age: 34
End: 2024-12-12

## 2024-12-12 NOTE — TELEPHONE ENCOUNTER
12/12/24 11:46 AM     Chart reviewed for Pap Smear (HPV) aka Cervical Cancer Screening was/were not submitted to the patient's insurance.     Cherise Lloyd MA   [facility]

## 2025-06-14 ENCOUNTER — OFFICE VISIT (OUTPATIENT)
Dept: URGENT CARE | Facility: CLINIC | Age: 35
End: 2025-06-14
Payer: COMMERCIAL

## 2025-06-14 VITALS
OXYGEN SATURATION: 99 % | TEMPERATURE: 98.4 F | SYSTOLIC BLOOD PRESSURE: 120 MMHG | DIASTOLIC BLOOD PRESSURE: 72 MMHG | RESPIRATION RATE: 18 BRPM | HEART RATE: 85 BPM

## 2025-06-14 DIAGNOSIS — N30.01 ACUTE CYSTITIS WITH HEMATURIA: Primary | ICD-10-CM

## 2025-06-14 DIAGNOSIS — R35.0 URINARY FREQUENCY: ICD-10-CM

## 2025-06-14 LAB
SL AMB  POCT GLUCOSE, UA: ABNORMAL
SL AMB LEUKOCYTE ESTERASE,UA: ABNORMAL
SL AMB POCT BILIRUBIN,UA: ABNORMAL
SL AMB POCT BLOOD,UA: ABNORMAL
SL AMB POCT CLARITY,UA: ABNORMAL
SL AMB POCT COLOR,UA: YELLOW
SL AMB POCT KETONES,UA: ABNORMAL
SL AMB POCT NITRITE,UA: ABNORMAL
SL AMB POCT PH,UA: 5
SL AMB POCT SPECIFIC GRAVITY,UA: 1.02
SL AMB POCT URINE PROTEIN: ABNORMAL
SL AMB POCT UROBILINOGEN: 0.2

## 2025-06-14 PROCEDURE — 81002 URINALYSIS NONAUTO W/O SCOPE: CPT

## 2025-06-14 PROCEDURE — 87086 URINE CULTURE/COLONY COUNT: CPT

## 2025-06-14 PROCEDURE — 87077 CULTURE AEROBIC IDENTIFY: CPT

## 2025-06-14 PROCEDURE — 99213 OFFICE O/P EST LOW 20 MIN: CPT

## 2025-06-14 RX ORDER — CEPHALEXIN 250 MG/5ML
500 POWDER, FOR SUSPENSION ORAL EVERY 8 HOURS SCHEDULED
Qty: 210 ML | Refills: 0 | Status: SHIPPED | OUTPATIENT
Start: 2025-06-14 | End: 2025-06-21

## 2025-06-14 NOTE — PROGRESS NOTES
[facility]        NAME: Eryn Villegas is a 34 y.o. female  : 1990    MRN: [id number]  DATE: 2025  TIME: 12:30 PM    Assessment and Plan   Acute cystitis with hematuria [N30.01]  1. Acute cystitis with hematuria  cephalexin (KEFLEX) 250 mg/5 mL suspension      2. Urinary frequency  POCT urine dip    Urine culture    Urine culture        Urine dip in office positive for leukocytes, protein and blood.  Will send urine culture and treat empirically with Keflex.  Recommend supportive care with OTC Tylenol/ibuprofen.  Instructed patient to rest and increase fluid intake. Instructed patient to follow-up with PCP for no improvement or worsening of symptoms.  Patient educated on red flag symptoms and when to proceed to the ED.  Patient agreeable and understands current treatment plan.     Patient Instructions     Patient Instructions   Please take Keflex three times daily for the next 7 days.      You may take OTC Azo/Phenazopyridine (Pyridium) 100 mg three times daily as needed for relief of UTI symptoms such as pain, burning, irritation, and urinary frequency. It should only be taken for the first 48 hours of treatment. This drug causes orange/yellow discoloration of the urine, which is a normal side effect.     Drink plenty of water and other fluids to help flush out bacteria and dilute your urine.    You may take OTC pain relievers such as Tylenol or Ibuprofen, and use a warm heating pad to help with your pain.  Avoid drinks that may irritate your bladder including coffee, alcohol, and citrus drinks.  Drinking cranberry juice or taking cranberry products may help reduce recurrent UTI's.      Please follow up with PCP or proceed to the ER for worsening symptoms (fever, chills, back/pelvic pain, and bloody urine).        Follow up with PCP in 3-5 days.  Proceed to  ER if symptoms worsen.    Chief Complaint     Chief Complaint   Patient presents with   • Possible UTI     Painful urination ,  lower left flank pain 1 day          History of Present Illness       34-year-old female presents to the clinic for evaluation of urinary symptoms x 2 days.  Patient reports last night she had some pain with urination.  She also reports associated symptoms including left flank pain and suprapubic pain.  She reports that the pain with urination has been worsening today.  She however denies any fevers, chills, urinary frequency, urinary urgency, hematuria, nausea, vomiting, diarrhea, abdominal pain body aches or headaches.  Patient reports taking OTC Tylenol/ibuprofen as needed for symptoms with mild relief.              Review of Systems   Review of Systems   Constitutional:  Negative for chills and fever.   Gastrointestinal:  Negative for abdominal pain, diarrhea, nausea and vomiting.   Genitourinary:  Positive for dysuria, flank pain (left) and pelvic pain (suprapubic). Negative for frequency, hematuria and urgency.   Musculoskeletal:  Negative for arthralgias and myalgias.   Neurological:  Negative for headaches.   All other systems reviewed and are negative.        Current Medications     Current Medications[1]    Current Allergies     Allergies as of 06/14/2025   • (No Known Allergies)            The following portions of the patient's history were reviewed and updated as appropriate: allergies, current medications, past family history, past medical history, past social history, past surgical history and problem list.     Past Medical History[2]    Past Surgical History[3]    Family History[4]      Medications have been verified.        Objective   /72   Pulse 85   Temp 98.4 °F (36.9 °C)   Resp 18   SpO2 99%        Physical Exam     Physical Exam  Vitals and nursing note reviewed.   Constitutional:       Appearance: Normal appearance.   HENT:      Head: Normocephalic and atraumatic.     Cardiovascular:      Rate and Rhythm: Normal rate and regular rhythm.      Heart sounds: Normal heart sounds.    Pulmonary:      Effort: Pulmonary effort is normal.      Breath sounds: Normal breath sounds.   Abdominal:      General: There is no distension.      Palpations: Abdomen is soft.      Tenderness: There is no abdominal tenderness. There is no right CVA tenderness or left CVA tenderness.     Skin:     General: Skin is warm and dry.     Neurological:      General: No focal deficit present.      Mental Status: She is alert and oriented to person, place, and time.     Psychiatric:         Mood and Affect: Mood normal.         Behavior: Behavior normal.                          [1]    Current Outpatient Medications:   •  Acetaminophen (TYLENOL) 650 mg/20.3mL suspension, Take 20.3 mL (650 mg total) by mouth every 6 (six) hours as needed for mild pain, Disp: 355 mL, Rfl: 1  •  cephalexin (KEFLEX) 250 mg/5 mL suspension, Take 10 mL (500 mg total) by mouth every 8 (eight) hours for 7 days, Disp: 210 mL, Rfl: 0  •  fluticasone (FLONASE) 50 mcg/act nasal spray, 1 spray into each nostril daily, Disp: 11.1 mL, Rfl: 0  •  ibuprofen (MOTRIN) 100 mg/5 mL suspension, Take 20 mL (400 mg total) by mouth every 6 (six) hours as needed for moderate pain, Disp: 273 mL, Rfl: 0[2]  Past Medical History:  Diagnosis Date   • Headache(784.0)     Get about 10 a month from regular to severe migraines   • Pneumonia     2nd grade   [3]  Past Surgical History:  Procedure Laterality Date   • NV LAPS ABD PRTM&OMENTUM DX W/WO SPEC BR/WA SPX N/A 4/15/2023    Procedure: LAPAROSCOPY DIAGNOSTIC, RIGHT OVARIAN CYSTECTOMY;  Surgeon: Rosy Ramires DO;  Location: [facility];  Service: Gynecology   • WISDOM TOOTH EXTRACTION     [4]  Family History  Problem Relation Name Age of Onset   • Arthritis Mother Mary Dove         Rheumatoid Arthritis and Fibromyalgia   • Autoimmune disease Mother Mary Dove         Immunocompromised   • Hearing loss Father Vincenzo Dove         Has hearing aids   • Dementia Maternal Grandfather Zhao Phillips          Pop-Pop   • Glaucoma Maternal Grandfather Zhao Phillips    • Bipolar disorder Brother Rock Dove         Bipolar 2   • Drug abuse Brother Rock Butlerose

## 2025-06-14 NOTE — PATIENT INSTRUCTIONS
Please take Keflex three times daily for the next 7 days.      You may take OTC Azo/Phenazopyridine (Pyridium) 100 mg three times daily as needed for relief of UTI symptoms such as pain, burning, irritation, and urinary frequency. It should only be taken for the first 48 hours of treatment. This drug causes orange/yellow discoloration of the urine, which is a normal side effect.     Drink plenty of water and other fluids to help flush out bacteria and dilute your urine.    You may take OTC pain relievers such as Tylenol or Ibuprofen, and use a warm heating pad to help with your pain.  Avoid drinks that may irritate your bladder including coffee, alcohol, and citrus drinks.  Drinking cranberry juice or taking cranberry products may help reduce recurrent UTI's.      Please follow up with PCP or proceed to the ER for worsening symptoms (fever, chills, back/pelvic pain, and bloody urine).

## 2025-06-15 ENCOUNTER — RESULTS FOLLOW-UP (OUTPATIENT)
Dept: URGENT CARE | Facility: CLINIC | Age: 35
End: 2025-06-15

## 2025-06-15 LAB — BACTERIA UR CULT: ABNORMAL

## 2025-06-16 LAB — BACTERIA UR CULT: NORMAL

## 2025-08-20 ENCOUNTER — VBI (OUTPATIENT)
Dept: ADMINISTRATIVE | Facility: OTHER | Age: 35
End: 2025-08-20

## (undated) DEVICE — SCD SEQUENTIAL COMPRESSION COMFORT SLEEVE MEDIUM KNEE LENGTH: Brand: KENDALL SCD

## (undated) DEVICE — PVC URETHRAL CATHETER: Brand: DOVER

## (undated) DEVICE — CHLORAPREP HI-LITE 26ML ORANGE

## (undated) DEVICE — BLUE HEAT SCOPE WARMER

## (undated) DEVICE — SUT VICRYL 4-0 PS-2 27 IN J426H

## (undated) DEVICE — GLOVE PI ULTRA TOUCH SZ.7.0

## (undated) DEVICE — GLOVE INDICATOR PI UNDERGLOVE SZ 7 BLUE

## (undated) DEVICE — ADHESIVE SKIN HIGH VISCOSITY EXOFIN 1ML

## (undated) DEVICE — TROCAR: Brand: KII FIOS FIRST ENTRY

## (undated) DEVICE — [HIGH FLOW INSUFFLATOR,  DO NOT USE IF PACKAGE IS DAMAGED,  KEEP DRY,  KEEP AWAY FROM SUNLIGHT,  PROTECT FROM HEAT AND RADIOACTIVE SOURCES.]: Brand: PNEUMOSURE

## (undated) DEVICE — DRAPE EQUIPMENT RF WAND

## (undated) DEVICE — 3M™ STERI-STRIP™ REINFORCED ADHESIVE SKIN CLOSURES, R1547, 1/2 IN X 4 IN (12 MM X 100 MM), 6 STRIPS/ENVELOPE: Brand: 3M™ STERI-STRIP™

## (undated) DEVICE — TROCAR: Brand: KII® SLEEVE

## (undated) DEVICE — INTENDED FOR TISSUE SEPARATION, AND OTHER PROCEDURES THAT REQUIRE A SHARP SURGICAL BLADE TO PUNCTURE OR CUT.: Brand: BARD-PARKER SAFETY BLADES SIZE 11, STERILE

## (undated) DEVICE — BETHLEHEM UNIVERSAL GYN LAP PK: Brand: CARDINAL HEALTH

## (undated) DEVICE — PLASTIC ADHESIVE BANDAGE: Brand: CURITY

## (undated) DEVICE — PREMIUM DRY TRAY LF: Brand: MEDLINE INDUSTRIES, INC.